# Patient Record
Sex: FEMALE | Race: WHITE | Employment: FULL TIME | URBAN - METROPOLITAN AREA
[De-identification: names, ages, dates, MRNs, and addresses within clinical notes are randomized per-mention and may not be internally consistent; named-entity substitution may affect disease eponyms.]

---

## 2021-05-19 ENCOUNTER — OFFICE VISIT (OUTPATIENT)
Dept: INTERNAL MEDICINE CLINIC | Age: 51
End: 2021-05-19
Payer: COMMERCIAL

## 2021-05-19 VITALS
HEIGHT: 68 IN | OXYGEN SATURATION: 97 % | DIASTOLIC BLOOD PRESSURE: 76 MMHG | HEART RATE: 90 BPM | TEMPERATURE: 98.2 F | RESPIRATION RATE: 16 BRPM | WEIGHT: 203 LBS | BODY MASS INDEX: 30.77 KG/M2 | SYSTOLIC BLOOD PRESSURE: 117 MMHG

## 2021-05-19 DIAGNOSIS — I10 ESSENTIAL HYPERTENSION: Primary | ICD-10-CM

## 2021-05-19 DIAGNOSIS — Z12.11 COLON CANCER SCREENING: ICD-10-CM

## 2021-05-19 DIAGNOSIS — J30.89 ENVIRONMENTAL AND SEASONAL ALLERGIES: ICD-10-CM

## 2021-05-19 DIAGNOSIS — E78.2 MIXED HYPERLIPIDEMIA: ICD-10-CM

## 2021-05-19 DIAGNOSIS — Z12.4 PAP SMEAR FOR CERVICAL CANCER SCREENING: ICD-10-CM

## 2021-05-19 DIAGNOSIS — E89.0 POSTOPERATIVE HYPOTHYROIDISM: ICD-10-CM

## 2021-05-19 DIAGNOSIS — J32.9 RECURRENT SINUS INFECTIONS: ICD-10-CM

## 2021-05-19 DIAGNOSIS — D51.0 PERNICIOUS ANEMIA: ICD-10-CM

## 2021-05-19 DIAGNOSIS — Z72.0 TOBACCO ABUSE: ICD-10-CM

## 2021-05-19 DIAGNOSIS — Z86.018 HISTORY OF BENIGN BREAST TUMOR: ICD-10-CM

## 2021-05-19 DIAGNOSIS — K21.9 GASTROESOPHAGEAL REFLUX DISEASE WITHOUT ESOPHAGITIS: ICD-10-CM

## 2021-05-19 DIAGNOSIS — E53.8 B12 DEFICIENCY: ICD-10-CM

## 2021-05-19 DIAGNOSIS — Z71.6 TOBACCO ABUSE COUNSELING: ICD-10-CM

## 2021-05-19 LAB
ALBUMIN SERPL-MCNC: 4.2 G/DL (ref 3.5–5)
ALBUMIN/GLOB SERPL: 1.2 {RATIO} (ref 1.1–2.2)
ALP SERPL-CCNC: 113 U/L (ref 45–117)
ALT SERPL-CCNC: 24 U/L (ref 12–78)
ANION GAP SERPL CALC-SCNC: 6 MMOL/L (ref 5–15)
APPEARANCE UR: CLEAR
AST SERPL-CCNC: 14 U/L (ref 15–37)
BASOPHILS # BLD: 0.1 K/UL (ref 0–0.1)
BASOPHILS NFR BLD: 1 % (ref 0–1)
BILIRUB SERPL-MCNC: 0.4 MG/DL (ref 0.2–1)
BILIRUB UR QL: NEGATIVE
BUN SERPL-MCNC: 9 MG/DL (ref 6–20)
BUN/CREAT SERPL: 13 (ref 12–20)
CALCIUM SERPL-MCNC: 9.6 MG/DL (ref 8.5–10.1)
CHLORIDE SERPL-SCNC: 100 MMOL/L (ref 97–108)
CHOLEST SERPL-MCNC: 305 MG/DL
CO2 SERPL-SCNC: 31 MMOL/L (ref 21–32)
COLOR UR: NORMAL
CREAT SERPL-MCNC: 0.72 MG/DL (ref 0.55–1.02)
DIFFERENTIAL METHOD BLD: ABNORMAL
EOSINOPHIL # BLD: 0.5 K/UL (ref 0–0.4)
EOSINOPHIL NFR BLD: 5 % (ref 0–7)
ERYTHROCYTE [DISTWIDTH] IN BLOOD BY AUTOMATED COUNT: 12.9 % (ref 11.5–14.5)
GLOBULIN SER CALC-MCNC: 3.4 G/DL (ref 2–4)
GLUCOSE SERPL-MCNC: 97 MG/DL (ref 65–100)
GLUCOSE UR STRIP.AUTO-MCNC: NEGATIVE MG/DL
HCT VFR BLD AUTO: 41 % (ref 35–47)
HDLC SERPL-MCNC: 48 MG/DL
HDLC SERPL: 6.4 {RATIO} (ref 0–5)
HGB BLD-MCNC: 13.3 G/DL (ref 11.5–16)
HGB UR QL STRIP: NEGATIVE
IMM GRANULOCYTES # BLD AUTO: 0 K/UL (ref 0–0.04)
IMM GRANULOCYTES NFR BLD AUTO: 0 % (ref 0–0.5)
KETONES UR QL STRIP.AUTO: NEGATIVE MG/DL
LDLC SERPL CALC-MCNC: 186.2 MG/DL (ref 0–100)
LEUKOCYTE ESTERASE UR QL STRIP.AUTO: NEGATIVE
LYMPHOCYTES # BLD: 2.3 K/UL (ref 0.8–3.5)
LYMPHOCYTES NFR BLD: 23 % (ref 12–49)
MCH RBC QN AUTO: 31.5 PG (ref 26–34)
MCHC RBC AUTO-ENTMCNC: 32.4 G/DL (ref 30–36.5)
MCV RBC AUTO: 97.2 FL (ref 80–99)
MICROALBUMIN UR TEST STR-MCNC: 10 MG/L (ref 0–20)
MONOCYTES # BLD: 0.8 K/UL (ref 0–1)
MONOCYTES NFR BLD: 8 % (ref 5–13)
NEUTS SEG # BLD: 6.5 K/UL (ref 1.8–8)
NEUTS SEG NFR BLD: 63 % (ref 32–75)
NITRITE UR QL STRIP.AUTO: NEGATIVE
NRBC # BLD: 0 K/UL (ref 0–0.01)
NRBC BLD-RTO: 0 PER 100 WBC
PH UR STRIP: 6.5 [PH] (ref 5–8)
PLATELET # BLD AUTO: 285 K/UL (ref 150–400)
PMV BLD AUTO: 11.8 FL (ref 8.9–12.9)
POTASSIUM SERPL-SCNC: 4.5 MMOL/L (ref 3.5–5.1)
PROT SERPL-MCNC: 7.6 G/DL (ref 6.4–8.2)
PROT UR STRIP-MCNC: NEGATIVE MG/DL
RBC # BLD AUTO: 4.22 M/UL (ref 3.8–5.2)
SODIUM SERPL-SCNC: 137 MMOL/L (ref 136–145)
SP GR UR REFRACTOMETRY: 1.01 (ref 1–1.03)
TRIGL SERPL-MCNC: 354 MG/DL (ref ?–150)
UROBILINOGEN UR QL STRIP.AUTO: 0.2 EU/DL (ref 0.2–1)
VIT B12 SERPL-MCNC: 883 PG/ML (ref 193–986)
VLDLC SERPL CALC-MCNC: 70.8 MG/DL
WBC # BLD AUTO: 10.3 K/UL (ref 3.6–11)

## 2021-05-19 PROCEDURE — 99214 OFFICE O/P EST MOD 30 MIN: CPT | Performed by: NURSE PRACTITIONER

## 2021-05-19 PROCEDURE — 93000 ELECTROCARDIOGRAM COMPLETE: CPT | Performed by: NURSE PRACTITIONER

## 2021-05-19 PROCEDURE — 82044 UR ALBUMIN SEMIQUANTITATIVE: CPT | Performed by: NURSE PRACTITIONER

## 2021-05-19 RX ORDER — PANTOPRAZOLE SODIUM 40 MG/1
40 TABLET, DELAYED RELEASE ORAL DAILY
Qty: 30 TABLET | Refills: 1 | Status: SHIPPED | OUTPATIENT
Start: 2021-05-19 | End: 2021-05-19 | Stop reason: SDUPTHER

## 2021-05-19 RX ORDER — LISINOPRIL AND HYDROCHLOROTHIAZIDE 12.5; 2 MG/1; MG/1
TABLET ORAL DAILY
COMMUNITY
End: 2021-05-19 | Stop reason: SDUPTHER

## 2021-05-19 RX ORDER — LEVOTHYROXINE SODIUM 175 UG/1
175 TABLET ORAL
Qty: 90 TABLET | Refills: 3 | Status: SHIPPED | OUTPATIENT
Start: 2021-05-19 | End: 2021-09-17 | Stop reason: DRUGHIGH

## 2021-05-19 RX ORDER — SYRINGE W-NEEDLE,DISPOSAB,3 ML 25GX5/8"
1 SYRINGE, EMPTY DISPOSABLE MISCELLANEOUS
Qty: 12 SYRINGE | Refills: 3 | Status: SHIPPED | OUTPATIENT
Start: 2021-05-19 | End: 2021-06-16 | Stop reason: SDUPTHER

## 2021-05-19 RX ORDER — LEVOTHYROXINE SODIUM 175 UG/1
175 TABLET ORAL
COMMUNITY
End: 2021-05-19 | Stop reason: SDUPTHER

## 2021-05-19 RX ORDER — PANTOPRAZOLE SODIUM 40 MG/1
40 TABLET, DELAYED RELEASE ORAL DAILY
Qty: 90 TABLET | Refills: 3 | Status: SHIPPED | OUTPATIENT
Start: 2021-05-19 | End: 2022-04-01 | Stop reason: SDUPTHER

## 2021-05-19 RX ORDER — LORATADINE 10 MG/1
10 TABLET ORAL
COMMUNITY

## 2021-05-19 RX ORDER — LISINOPRIL AND HYDROCHLOROTHIAZIDE 12.5; 2 MG/1; MG/1
1 TABLET ORAL DAILY
Qty: 90 TABLET | Refills: 3 | Status: SHIPPED | OUTPATIENT
Start: 2021-05-19 | End: 2022-04-01 | Stop reason: SDUPTHER

## 2021-05-19 RX ORDER — MOMETASONE FUROATE 50 UG/1
2 SPRAY, METERED NASAL DAILY
COMMUNITY

## 2021-05-19 RX ORDER — DIPHENHYDRAMINE HCL 25 MG
25 TABLET ORAL
COMMUNITY

## 2021-05-19 RX ORDER — CYANOCOBALAMIN 1000 UG/ML
1000 INJECTION, SOLUTION INTRAMUSCULAR; SUBCUTANEOUS
Qty: 12 VIAL | Refills: 3
Start: 2021-05-19 | End: 2021-06-16 | Stop reason: SDUPTHER

## 2021-05-19 RX ORDER — PANTOPRAZOLE SODIUM 40 MG/1
40 TABLET, DELAYED RELEASE ORAL DAILY
Qty: 30 TABLET | Refills: 1 | Status: SHIPPED
Start: 2021-05-19 | End: 2021-06-16 | Stop reason: SDUPTHER

## 2021-05-19 RX ORDER — CYANOCOBALAMIN 1000 UG/ML
1000 INJECTION, SOLUTION INTRAMUSCULAR; SUBCUTANEOUS ONCE
COMMUNITY
End: 2021-05-19 | Stop reason: SDUPTHER

## 2021-05-19 NOTE — PROGRESS NOTES
Chief Complaint   Patient presents with    [facility]     new patient    Allergies       SUBJECTIVE:    Macario Shearer is a 45 y.o. female who is new to me, and here today to discuss current medical concerns and conditions. Patient has a history of hypertension, post thyroidectomy hypothyroidism, pernicious anemia on B12 deficiency, allergic rhinitis, tobacco abuse, and elevated cholesterol levels by lab. She is currently being managed for hypertension, and is compliant with current medications as listed. She denies any adverse side effects of medication(s) at this time, and states She is tolerating them well. The patient denies any symptoms of chest pain, shortness of breath, dizziness, orthostasis, or palpitations. She is not measuring blood pressure on a regular basis. The patient states a history of elevated cholesterol in the past, and excessively high LDL. She states she was trialed on a statin medication several years ago, but had adverse reaction with muscle and joint pain, and was subsequently stopped from taking the medication any longer. She states it has been quite sometime since she had her cholesterol levels checked again. Patient also suffers from seasonal and environmental allergies. She states she is currently taking over-the-counter Claritin, Nasonex, and Benadryl which appear to be helping somewhat. However, she continues to have breakthrough symptoms, and has a history of recurrent sinus infections as a result. She also states intermittent issues with reflux and heartburn. She denies any black tarry stools or blood in her stools. She states she occasionally uses an over-the-counter acid blocker, but not regularly. She is requesting a referral to a gynecologist to do her Pap smears. She has a history of benign tumor of the breast with lumpectomy. She states she has never had a colonoscopy done to screen for colon cancer.       Current Outpatient Medications Medication Sig Dispense Refill    mometasone (Nasonex) 50 mcg/actuation nasal spray 2 Sprays daily.  loratadine (Claritin) 10 mg tablet Take 10 mg by mouth.  diphenhydrAMINE (Benadryl Allergy) 25 mg tablet Take 25 mg by mouth every six (6) hours as needed.  emollient base (CREAM BASE EX) by Apply Externally route.  Syringe with Needle, Disp, 3 mL 22 x 1 1/2\" syrg 1 Units by Does Not Apply route every seven (7) days. Indications: Use with B12 weekly 12 Syringe 3    cyanocobalamin (VITAMIN B12) 1,000 mcg/mL injection 1 mL by IntraMUSCular route every seven (7) days. Indications: anemia due to vitamin B12 deficiency-pernicious anemia, inadequate vitamin B12 12 Vial 3    levothyroxine (Synthroid) 175 mcg tablet Take 1 Tablet by mouth Daily (before breakfast). Indications: a condition with low thyroid hormone levels 90 Tablet 3    lisinopril-hydroCHLOROthiazide (PRINZIDE, ZESTORETIC) 20-12.5 mg per tablet Take 1 Tablet by mouth daily. Indications: high blood pressure 90 Tablet 3    pantoprazole (PROTONIX) 40 mg tablet Take 1 Tablet by mouth daily. Indications: indigestion 90 Tablet 3    pantoprazole (PROTONIX) 40 mg tablet Take 1 Tablet by mouth daily. 30 Tablet 1     History reviewed. No pertinent past medical history. History reviewed. No pertinent surgical history.   Allergies   Allergen Reactions    Latex Other (comments)    Augmentin [Amoxicillin-Pot Clavulanate] Other (comments)    Bee Venom Protein (Honey Bee) Other (comments)    Erythromycin Other (comments)    Sulfur-8 Other (comments)       REVIEW OF SYSTEMS:                                        POSITIVE= bold text  Negative = regular text    General:                     fever, chills, sweats, generalized weakness, weight loss/gain,                                       loss of appetite   Eyes:                           blurred vision, eye pain, loss of vision, double vision  ENT:                            rhinorrhea, pharyngitis   Respiratory:               cough, sputum production, SOB, NAVAS, wheezing, pleuritic pain   Cardiology:                chest pain, palpitations, orthopnea, PND, edema, syncope   Gastrointestinal:       abdominal pain , N/V, diarrhea, dysphagia, constipation, bleeding, reflux  Genitourinary:           frequency, urgency, dysuria, hematuria, incontinence   Muskuloskeletal :      arthralgia, myalgia, back pain  Hematology:              easy bruising, nose or gum bleeding, lymphadenopathy   Dermatological:         rash, ulceration, pruritis, color change / jaundice  Endocrine:                 hot flashes or polydipsia   Neurological:             headache, dizziness, confusion, focal weakness, paresthesia,                                      Speech difficulties, memory loss, gait difficulty  Psychological:          Feelings of anxiety, depression, agitation        Social History     Socioeconomic History    Marital status: UNKNOWN     Spouse name: Not on file    Number of children: Not on file    Years of education: Not on file    Highest education level: Not on file   Tobacco Use    Smoking status: Current Every Day Smoker     Types: Cigarettes    Smokeless tobacco: Never Used   Substance and Sexual Activity    Alcohol use: Yes     Social Determinants of Health     Financial Resource Strain:     Difficulty of Paying Living Expenses:    Food Insecurity:     Worried About Running Out of Food in the Last Year:     Ran Out of Food in the Last Year:    Transportation Needs:     Lack of Transportation (Medical):      Lack of Transportation (Non-Medical):    Physical Activity:     Days of Exercise per Week:     Minutes of Exercise per Session:    Stress:     Feeling of Stress :    Social Connections:     Frequency of Communication with Friends and Family:     Frequency of Social Gatherings with Friends and Family:     Attends [employer] Services:     Active Member of Clubs or Organizations:     Attends Club or Organization Meetings:     Marital Status:      History reviewed. No pertinent family history. OBJECTIVE:     Visit Vitals  /76 (BP 1 Location: Left upper arm, BP Patient Position: Sitting, BP Cuff Size: Small adult)   Pulse 90   Temp 98.2 °F (36.8 °C)   Resp 16   Ht 5' 8\" (1.727 m)   Wt 203 lb (92.1 kg)   SpO2 97%   BMI 30.87 kg/m²       Constitutional: She appears well nourished, of stated age, and dressed appropriately. Eyes: Sclera anicteric, PERRLA, EOMI  ENT: Nares clear, inflamed turbinates bilaterally with clear exudate. Cobblestone appearance to oropharynx. Neck: Supple without lymphadenopathy. Thyroid normal, No JVD or bruits  Respiratory: Clear to ascultation X5, normal inspiratory effort, no adventitious breath sounds. Cardiovascular: Regular rate and rhythm, no murmurs, rubs or gallops, PMI not displaced, No thrills, no peripheral edema  Gastrointestinal: Abdomen non-distended, soft, mild epigastric tenderness on palpation, bowel sounds normal and active X4. Hematologic: No purpura, petechiae or unexplained bruising  Lymphatic: No lymph node enlargemant. Integumentary: No unusual rashes or suspicious skin lesions noted. Nails appear normal.  Neuro: Non-focal exam, A & O X 3.   Psychiatric: Appropriate affect and demeanor, pleasant and cooperative. Patient's thought content and thought processing appear to be within normal limits. EKG: EKG performed in office today and read by me. Patient shows no significant ST elevation/depression. No signs of T wave ischemia. Otherwise unremarkable EKG. ASSESSMENT/PLAN:     ICD-10-CM ICD-9-CM    1.  Essential hypertension  I10 401.9 AMB POC EKG ROUTINE W/ 12 LEADS, INTER & REP      lisinopril-hydroCHLOROthiazide (PRINZIDE, ZESTORETIC) 20-12.5 mg per tablet      CBC WITH AUTOMATED DIFF      METABOLIC PANEL, COMPREHENSIVE      URINALYSIS W/ RFLX MICROSCOPIC      AMB POC URINE, MICROALBUMIN, SEMIQUANT (1 RESULT)      URINALYSIS W/ RFLX MICROSCOPIC      METABOLIC PANEL, COMPREHENSIVE      CBC WITH AUTOMATED DIFF   2. Mixed hyperlipidemia  K33.2 544.1 METABOLIC PANEL, COMPREHENSIVE      LIPID PANEL      LIPID PANEL      METABOLIC PANEL, COMPREHENSIVE   3. Tobacco abuse  Z72.0 305.1    4. Tobacco abuse counseling  Z71.6 V65.42      305.1    5. Environmental and seasonal allergies  J30.89 477.8    6. Recurrent sinus infections  J32.9 473.9    7. Postoperative hypothyroidism  E89.0 244.0 levothyroxine (Synthroid) 175 mcg tablet   8. Pernicious anemia  D51.0 281.0 Syringe with Needle, Disp, 3 mL 22 x 1 1/2\" syrg      cyanocobalamin (VITAMIN B12) 1,000 mcg/mL injection      VITAMIN B12      VITAMIN B12   9. B12 deficiency  E53.8 266.2 Syringe with Needle, Disp, 3 mL 22 x 1 1/2\" syrg      cyanocobalamin (VITAMIN B12) 1,000 mcg/mL injection      VITAMIN B12      VITAMIN B12   10. Gastroesophageal reflux disease without esophagitis  K21.9 530.81 pantoprazole (PROTONIX) 40 mg tablet      pantoprazole (PROTONIX) 40 mg tablet      DISCONTINUED: pantoprazole (PROTONIX) 40 mg tablet   11. History of benign breast tumor  Z86.018 V13.89    12. Colon cancer screening  Z12.11 V76.51 REFERRAL FOR COLONOSCOPY   13. Pap smear for cervical cancer screening  Z12.4 V76.2 REFERRAL TO OBSTETRICS AND GYNECOLOGY     1: We will do baseline labs today including: CBC, CMP, lipid panel, TSH, urinalysis, microalbumin, and B12 level. 2: I will start the patient on Protonix 40 mg daily for management of her symptoms of GERD  3: Patient to resume B12 injections 1000 mcg weekly as requested. 4: Patient to start Allegra 180 mg daily for management of allergies. May continue to take Nasonex as well.  5: Patient will be referred to OB/GYN as requested for further Pap smears. 6: Patient will be referred to GI for screening colonoscopy. 7: Patient to follow-up with me in approximately 1 month, or sooner as needed. Patient states understanding and agrees with plan.     Orders Placed This Encounter    CBC WITH AUTOMATED DIFF    METABOLIC PANEL, COMPREHENSIVE    LIPID PANEL    URINALYSIS W/ RFLX MICROSCOPIC    VITAMIN B12    Referral for Colonoscopy (options for GI, Colon &  Rectal Surgery, & General Surgery)    REFERRAL TO OBSTETRICS AND GYNECOLOGY    AMB POC URINE, MICROALBUMIN, SEMIQUANT (1 RESULT)    AMB POC EKG ROUTINE W/ 12 LEADS, INTER & REP    mometasone (Nasonex) 50 mcg/actuation nasal spray    loratadine (Claritin) 10 mg tablet    diphenhydrAMINE (Benadryl Allergy) 25 mg tablet    DISCONTD: levothyroxine (Synthroid) 175 mcg tablet    DISCONTD: lisinopril-hydroCHLOROthiazide (PRINZIDE, ZESTORETIC) 20-12.5 mg per tablet    DISCONTD: cyanocobalamin (VITAMIN B12) 1,000 mcg/mL injection    emollient base (CREAM BASE EX)    DISCONTD: pantoprazole (PROTONIX) 40 mg tablet    Syringe with Needle, Disp, 3 mL 22 x 1 1/2\" syrg    cyanocobalamin (VITAMIN B12) 1,000 mcg/mL injection    levothyroxine (Synthroid) 175 mcg tablet    lisinopril-hydroCHLOROthiazide (PRINZIDE, ZESTORETIC) 20-12.5 mg per tablet    pantoprazole (PROTONIX) 40 mg tablet    pantoprazole (PROTONIX) 40 mg tablet         ATTENTION:   This medical record was transcribed using an electronic medical records system. Although proofread, it may and can contain electronic and spelling errors. Other human spelling and other errors may be present. Corrections may be executed at a later time. Please feel free to contact us for any clarifications as needed. Follow-up and Dispositions    · Return in about 4 weeks (around 6/16/2021) for Follow up. Signed,  Wood Haynes.  Fidela Habermann, MSN APRN FNP-BC

## 2021-05-24 NOTE — PROGRESS NOTES
Domo Dubon is a 45 y.o. female  HIPAA verified by two patient identifiers. Called spoke to pt. Advised patient of the following B12 is currently in normal range.  You may start weekly injections as discussed.     Urinalysis is normal.      Cholesterol levels are excessively elevated as predicted.  This is likely genetic in origin, but will need to be treated to reduce long-term risk factors.  Please let me know what previous medications you have tried and any that did not agree with you.     Kidney function, liver functions, and electrolytes are normal.  Blood count shows no signs of anemia presently. Pt verbalized understanding of information discussed w/ no further questions at this time.

## 2021-05-24 NOTE — PROGRESS NOTES
B12 is currently in normal range. You may start weekly injections as discussed. Urinalysis is normal.     Cholesterol levels are excessively elevated as predicted. This is likely genetic in origin, but will need to be treated to reduce long-term risk factors. Please let me know what previous medications you have tried and any that did not agree with you. Kidney function, liver functions, and electrolytes are normal.  Blood count shows no signs of anemia presently.

## 2021-06-16 ENCOUNTER — OFFICE VISIT (OUTPATIENT)
Dept: INTERNAL MEDICINE CLINIC | Age: 51
End: 2021-06-16
Payer: COMMERCIAL

## 2021-06-16 VITALS
DIASTOLIC BLOOD PRESSURE: 88 MMHG | HEART RATE: 77 BPM | OXYGEN SATURATION: 99 % | TEMPERATURE: 98.6 F | SYSTOLIC BLOOD PRESSURE: 132 MMHG | RESPIRATION RATE: 16 BRPM | HEIGHT: 70 IN | WEIGHT: 207.6 LBS | BODY MASS INDEX: 29.72 KG/M2

## 2021-06-16 DIAGNOSIS — D51.0 PERNICIOUS ANEMIA: ICD-10-CM

## 2021-06-16 DIAGNOSIS — E78.01 FAMILIAL HYPERCHOLESTEROLEMIA: ICD-10-CM

## 2021-06-16 DIAGNOSIS — Z71.6 TOBACCO ABUSE COUNSELING: ICD-10-CM

## 2021-06-16 DIAGNOSIS — I10 ESSENTIAL HYPERTENSION: Primary | ICD-10-CM

## 2021-06-16 DIAGNOSIS — Z72.0 TOBACCO ABUSE: ICD-10-CM

## 2021-06-16 DIAGNOSIS — E53.8 B12 DEFICIENCY: ICD-10-CM

## 2021-06-16 DIAGNOSIS — K21.9 GASTROESOPHAGEAL REFLUX DISEASE WITHOUT ESOPHAGITIS: ICD-10-CM

## 2021-06-16 DIAGNOSIS — E03.9 ACQUIRED HYPOTHYROIDISM: ICD-10-CM

## 2021-06-16 DIAGNOSIS — I25.83 CORONARY ARTERY DISEASE DUE TO LIPID RICH PLAQUE: ICD-10-CM

## 2021-06-16 DIAGNOSIS — I25.10 CORONARY ARTERY DISEASE DUE TO LIPID RICH PLAQUE: ICD-10-CM

## 2021-06-16 DIAGNOSIS — Z78.9 STATIN INTOLERANCE: ICD-10-CM

## 2021-06-16 DIAGNOSIS — E78.2 MIXED HYPERLIPIDEMIA: ICD-10-CM

## 2021-06-16 PROCEDURE — 99214 OFFICE O/P EST MOD 30 MIN: CPT | Performed by: NURSE PRACTITIONER

## 2021-06-16 RX ORDER — SYRINGE W-NEEDLE,DISPOSAB,3 ML 25GX5/8"
1 SYRINGE, EMPTY DISPOSABLE MISCELLANEOUS
Qty: 12 SYRINGE | Refills: 3 | Status: SHIPPED | OUTPATIENT
Start: 2021-06-16 | End: 2022-01-11 | Stop reason: SDUPTHER

## 2021-06-16 RX ORDER — CYANOCOBALAMIN 1000 UG/ML
1000 INJECTION, SOLUTION INTRAMUSCULAR; SUBCUTANEOUS
Qty: 12 VIAL | Refills: 3
Start: 2021-06-16 | End: 2021-06-21 | Stop reason: SDUPTHER

## 2021-06-16 RX ORDER — EZETIMIBE 10 MG/1
10 TABLET ORAL DAILY
Qty: 90 TABLET | Refills: 1 | Status: SHIPPED | OUTPATIENT
Start: 2021-06-16 | End: 2022-04-01 | Stop reason: SDUPTHER

## 2021-06-16 RX ORDER — FEXOFENADINE HCL AND PSEUDOEPHEDRINE HCI 60; 120 MG/1; MG/1
1 TABLET, EXTENDED RELEASE ORAL EVERY 12 HOURS
COMMUNITY

## 2021-06-16 NOTE — PROGRESS NOTES
Sienna Henderson is a 45 y.o. female    Chief Complaint   Patient presents with    Discuss Medications     4 wk follow up       Visit Vitals  /88 (BP 1 Location: Left upper arm, BP Patient Position: Sitting)   Pulse 77   Temp 98.6 °F (37 °C)   Resp 16   Ht 5' 10\" (1.778 m)   Wt 207 lb 9.6 oz (94.2 kg)   SpO2 99%   BMI 29.79 kg/m²           1. Have you been to the ER, urgent care clinic since your last visit? Hospitalized since your last visit? No     2. Have you seen or consulted any other health care providers outside of the [address] [place] [address] since your last visit? Include any pap smears or colon screening.  No

## 2021-06-16 NOTE — PROGRESS NOTES
Chief Complaint   Patient presents with    Discuss Medications     4 wk follow up       SUBJECTIVE:    Enrique Cordova is a 45 y.o. female who is here today for a follow up appointment regarding her hypertension, hypercholesterolemia, hypothyroidism, and gastroesophageal reflux disease. At her last encounter, the patient had fasting lab work performed, and was found to have a total cholesterol level of greater than 300, and an LDL level of 185. She has been trialed on statins in the past, but was intolerant due to excessive pain to her muscles and joints. She has not tried anything else since that time. She continues to smoke cigarettes despite repeated warnings, but states that she had successfully quit for a period of time during her pregnancies in the past.  She has tried Chantix and Wellbutrin, but without much efficacy. She continues to take medication for management of her hypertension, and is tolerating well overall. She is not checking her blood pressures on a regular basis though. She is taking Protonix for management of her gastroesophageal reflux issues, and has had excellent efficacy with use. She denies any further symptoms at this time. She continues to take Synthroid for management of her hypothyroidism, and is tolerating well. She denies any recent episodes of chest pain, chest pressure, shortness of breath, headaches, dizziness, palpitations, or syncope episodes. Current Outpatient Medications   Medication Sig Dispense Refill    fexofenadine-pseudoephedrine (Allegra-D 12 Hour)  mg Tb12 Take 1 Tablet by mouth every twelve (12) hours.  ezetimibe (ZETIA) 10 mg tablet Take 1 Tablet by mouth daily. 90 Tablet 1    mometasone (Nasonex) 50 mcg/actuation nasal spray 2 Sprays daily.  diphenhydrAMINE (Benadryl Allergy) 25 mg tablet Take 25 mg by mouth every six (6) hours as needed.  emollient base (CREAM BASE EX) by Apply Externally route.       Syringe with Needle, Disp, 3 mL 22 x 1 1/2\" syrg 1 Units by Does Not Apply route every seven (7) days. Indications: Use with B12 weekly 12 Syringe 3    cyanocobalamin (VITAMIN B12) 1,000 mcg/mL injection 1 mL by IntraMUSCular route every seven (7) days. Indications: anemia due to vitamin B12 deficiency-pernicious anemia, inadequate vitamin B12 12 Vial 3    levothyroxine (Synthroid) 175 mcg tablet Take 1 Tablet by mouth Daily (before breakfast). Indications: a condition with low thyroid hormone levels 90 Tablet 3    lisinopril-hydroCHLOROthiazide (PRINZIDE, ZESTORETIC) 20-12.5 mg per tablet Take 1 Tablet by mouth daily. Indications: high blood pressure 90 Tablet 3    pantoprazole (PROTONIX) 40 mg tablet Take 1 Tablet by mouth daily. Indications: indigestion 90 Tablet 3    loratadine (Claritin) 10 mg tablet Take 10 mg by mouth. (Patient not taking: Reported on 6/16/2021)       History reviewed. No pertinent past medical history. History reviewed. No pertinent surgical history.   Allergies   Allergen Reactions    Latex Other (comments)    Augmentin [Amoxicillin-Pot Clavulanate] Other (comments)    Bee Venom Protein (Honey Bee) Other (comments)    Erythromycin Other (comments)    Sulfur-8 Other (comments)       REVIEW OF SYSTEMS:                                        POSITIVE= bold text  Negative = regular text    General:                     fever, chills, sweats, generalized weakness, weight loss/gain,                                       loss of appetite   Eyes:                           blurred vision, eye pain, loss of vision, double vision  ENT:                            rhinorrhea, pharyngitis   Respiratory:               cough, sputum production, SOB, NAVAS, wheezing, pleuritic pain   Cardiology:                chest pain, palpitations, orthopnea, PND, edema, syncope   Gastrointestinal:       abdominal pain , N/V, diarrhea, dysphagia, constipation, bleeding   Genitourinary:           frequency, urgency, dysuria, hematuria, incontinence   Muskuloskeletal :      arthralgia, myalgia, back pain  Hematology:              easy bruising, nose or gum bleeding, lymphadenopathy   Dermatological:         rash, ulceration, pruritis, color change / jaundice  Endocrine:                 hot flashes or polydipsia   Neurological:             headache, dizziness, confusion, focal weakness, paresthesia,                                      Speech difficulties, memory loss, gait difficulty  Psychological:          Feelings of anxiety, depression, agitation        Social History     Socioeconomic History    Marital status: UNKNOWN     Spouse name: Not on file    Number of children: Not on file    Years of education: Not on file    Highest education level: Not on file   Tobacco Use    Smoking status: Current Every Day Smoker     Types: Cigarettes    Smokeless tobacco: Never Used   Substance and Sexual Activity    Alcohol use: Yes     Social Determinants of Health     Financial Resource Strain:     Difficulty of Paying Living Expenses:    Food Insecurity:     Worried About Running Out of Food in the Last Year:     Ran Out of Food in the Last Year:    Transportation Needs:     Lack of Transportation (Medical):  Lack of Transportation (Non-Medical):    Physical Activity:     Days of Exercise per Week:     Minutes of Exercise per Session:    Stress:     Feeling of Stress :    Social Connections:     Frequency of Communication with Friends and Family:     Frequency of Social Gatherings with Friends and Family:     Attends [employer] Services:     Active Member of Clubs or Organizations:     Attends Club or Organization Meetings:     Marital Status:      History reviewed. No pertinent family history.     OBJECTIVE:     Visit Vitals  /88 (BP 1 Location: Left upper arm, BP Patient Position: Sitting)   Pulse 77   Temp 98.6 °F (37 °C)   Resp 16   Ht 5' 8\" (1.727 m)   Wt 207 lb 9.6 oz (94.2 kg)   SpO2 99%   BMI 31.57 kg/m² Constitutional: She appears well nourished, of stated age, and dressed appropriately. Eyes: Sclera anicteric, PERRLA, EOMI  Neck: Supple without lymphadenopathy. Thyroid normal, No JVD or bruits  Respiratory: Clear to ascultation X5, normal inspiratory effort, no adventitious breath sounds. Cardiovascular: Regular rate and rhythm, no murmurs, rubs or gallops, PMI not displaced, No thrills, no peripheral edema  Neuro: Non-focal exam, A & O X 3.   Psychiatric: Appropriate affect and demeanor, pleasant and cooperative. Patient's thought content and thought processing appear to be within normal limits. ASSESSMENT/PLAN:     ICD-10-CM ICD-9-CM    1. Essential hypertension  I10 401.9 CT HEART W/O CONT WITH CALCIUM   2. Mixed hyperlipidemia  E78.2 272.2 ezetimibe (ZETIA) 10 mg tablet      CT HEART W/O CONT WITH CALCIUM   3. Familial hypercholesterolemia  E78.01 272.0 CT HEART W/O CONT WITH CALCIUM   4. Acquired hypothyroidism  E03.9 244.9    5. Gastroesophageal reflux disease without esophagitis  K21.9 530.81    6. Tobacco abuse  Z72.0 305.1 CT HEART W/O CONT WITH CALCIUM   7. Tobacco abuse counseling  Z71.6 V65.42      305.1    8. Statin intolerance  Z78.9 995.27      1: Patient to continue lisinopril/HCTZ for management of hypertension. 2: Patient to continue Synthroid for management of hypothyroidism. 3: I will start patient on Zetia 10 mg daily for management of cholesterol. 4: Patient will be sent for CT scan of heart with calcium. 5: Patient to continue Protonix daily for management of GERD. 6: Patient must stop smoking! Benefits and options were thoroughly discussed today. Patient states she will work on this. 7: ER warnings given to patient for any chest pain, chest pressure, palpitations, shortness of breath, dizziness, headaches or syncope episodes. 8: Patient to follow-up with me in approximately 3 months, or sooner as needed. Patient states understanding and agrees with plan.     Orders Placed This Encounter    CT HEART W/O CONT WITH CALCIUM    fexofenadine-pseudoephedrine (Allegra-D 12 Hour)  mg Tb12    ezetimibe (ZETIA) 10 mg tablet         ATTENTION:   This medical record was transcribed using an electronic medical records system. Although proofread, it may and can contain electronic and spelling errors. Other human spelling and other errors may be present. Corrections may be executed at a later time. Please feel free to contact us for any clarifications as needed. Signed,  Jhonny Barnes.  Iliana Abrams, MSN APRN FNP-BC

## 2021-06-21 ENCOUNTER — HOSPITAL ENCOUNTER (OUTPATIENT)
Dept: CT IMAGING | Age: 51
Discharge: HOME | End: 2021-06-21
Attending: NURSE PRACTITIONER
Payer: SELF-PAY

## 2021-06-21 DIAGNOSIS — E53.8 B12 DEFICIENCY: ICD-10-CM

## 2021-06-21 DIAGNOSIS — D51.0 PERNICIOUS ANEMIA: ICD-10-CM

## 2021-06-21 DIAGNOSIS — Z72.0 TOBACCO ABUSE: ICD-10-CM

## 2021-06-21 DIAGNOSIS — E78.01 FAMILIAL HYPERCHOLESTEROLEMIA: ICD-10-CM

## 2021-06-21 DIAGNOSIS — E78.2 MIXED HYPERLIPIDEMIA: ICD-10-CM

## 2021-06-21 DIAGNOSIS — I10 ESSENTIAL HYPERTENSION: ICD-10-CM

## 2021-06-21 PROCEDURE — 75571 CT HRT W/O DYE W/CA TEST: CPT

## 2021-06-21 RX ORDER — CYANOCOBALAMIN 1000 UG/ML
1000 INJECTION, SOLUTION INTRAMUSCULAR; SUBCUTANEOUS
Qty: 12 VIAL | Refills: 3
Start: 2021-06-21 | End: 2021-06-24 | Stop reason: SDUPTHER

## 2021-06-21 NOTE — TELEPHONE ENCOUNTER
Last Refill: 21  Last Visit: 2021   Next Visit: 2021    Requested Prescriptions     Pending Prescriptions Disp Refills    cyanocobalamin (VITAMIN B12) 1,000 mcg/mL injection 12 Vial 3     Si mL by IntraMUSCular route every seven (7) days.  Indications: anemia due to vitamin B12 deficiency-pernicious anemia, inadequate vitamin B12

## 2021-06-21 NOTE — PROGRESS NOTES
Your scan shows that you have a fairly significant amount of buildup in your coronary arteries, especially to the left anterior descending, and the left circumflex. Scores place you in the 90th percentile rank. This means that less than 10% of women in your age group will have a higher calcium score. As prescribed, I would like for you to continue the Zetia for now. In addition, I am going to refer you to cardiology for further evaluation.

## 2021-06-24 DIAGNOSIS — E53.8 B12 DEFICIENCY: ICD-10-CM

## 2021-06-24 DIAGNOSIS — D51.0 PERNICIOUS ANEMIA: ICD-10-CM

## 2021-06-24 RX ORDER — CYANOCOBALAMIN 1000 UG/ML
1000 INJECTION, SOLUTION INTRAMUSCULAR; SUBCUTANEOUS
Qty: 12 VIAL | Refills: 3
Start: 2021-06-24 | End: 2021-07-07 | Stop reason: SDUPTHER

## 2021-06-24 NOTE — TELEPHONE ENCOUNTER
PCP: Tamar Roe NP    Last appt: 2021  Future Appointments   Date Time Provider Marshall Gil   2021  9:00 AM Tamar Roe NP [facility] AMB       Requested Prescriptions     Pending Prescriptions Disp Refills    cyanocobalamin (VITAMIN B12) 1,000 mcg/mL injection 12 Vial 3     Si mL by IntraMUSCular route every seven (7) days.  Indications: anemia due to vitamin B12 deficiency-pernicious anemia, inadequate vitamin B12       Prior labs and Blood pressures:  BP Readings from Last 3 Encounters:   21 132/88   21 117/76     Lab Results   Component Value Date/Time    Sodium 137 2021 01:21 PM    Potassium 4.5 2021 01:21 PM    Chloride 100 2021 01:21 PM    CO2 31 2021 01:21 PM    Anion gap 6 2021 01:21 PM    Glucose 97 2021 01:21 PM    BUN 9 2021 01:21 PM    Creatinine 0.72 2021 01:21 PM    BUN/Creatinine ratio 13 2021 01:21 PM    GFR est AA >60 2021 01:21 PM    GFR est non-AA >60 2021 01:21 PM    Calcium 9.6 2021 01:21 PM     No results found for: HBA1C, [id number], QMG2WSCZ  Lab Results   Component Value Date/Time    Cholesterol, total 305 (H) 2021 01:21 PM    HDL Cholesterol 48 2021 01:21 PM    LDL, calculated 186.2 (H) 2021 01:21 PM    VLDL, calculated 70.8 2021 01:21 PM    Triglyceride 354 (H) 2021 01:21 PM    CHOL/HDL Ratio 6.4 (H) 2021 01:21 PM     No results found for: VITD3, XQVID2, XQVID3, XQVID, VD3RIA    No results found for: TSH, TSH2, TSH3, TSHP, TSHEXT

## 2021-06-24 NOTE — PROGRESS NOTES
Called patient and patient would like a B12 injection refill over to her [facility] mail order pharmacy. I will send over the refill today. No further questions.

## 2021-07-07 DIAGNOSIS — E53.8 B12 DEFICIENCY: ICD-10-CM

## 2021-07-07 DIAGNOSIS — D51.0 PERNICIOUS ANEMIA: ICD-10-CM

## 2021-07-07 NOTE — TELEPHONE ENCOUNTER
Last Refill: [facility] states they did not receive prescription refill request on 21  Last Visit: 2021   Next Visit: 2021    Requested Prescriptions     Pending Prescriptions Disp Refills    cyanocobalamin (VITAMIN B12) 1,000 mcg/mL injection 12 Vial 3     Si mL by IntraMUSCular route every seven (7) days.  Indications: anemia due to vitamin B12 deficiency-pernicious anemia, inadequate vitamin B12

## 2021-07-08 RX ORDER — CYANOCOBALAMIN 1000 UG/ML
1000 INJECTION, SOLUTION INTRAMUSCULAR; SUBCUTANEOUS
Qty: 12 VIAL | Refills: 3
Start: 2021-07-08 | End: 2021-07-19 | Stop reason: SDUPTHER

## 2021-07-19 DIAGNOSIS — E53.8 B12 DEFICIENCY: ICD-10-CM

## 2021-07-19 DIAGNOSIS — D51.0 PERNICIOUS ANEMIA: ICD-10-CM

## 2021-07-20 RX ORDER — CYANOCOBALAMIN 1000 UG/ML
1000 INJECTION, SOLUTION INTRAMUSCULAR; SUBCUTANEOUS
Qty: 12 VIAL | Refills: 3 | Status: SHIPPED | OUTPATIENT
Start: 2021-07-20 | End: 2022-04-27

## 2021-09-16 ENCOUNTER — OFFICE VISIT (OUTPATIENT)
Dept: INTERNAL MEDICINE CLINIC | Age: 51
End: 2021-09-16
Payer: COMMERCIAL

## 2021-09-16 VITALS
DIASTOLIC BLOOD PRESSURE: 85 MMHG | HEIGHT: 70 IN | HEART RATE: 75 BPM | TEMPERATURE: 98.6 F | WEIGHT: 206.8 LBS | BODY MASS INDEX: 29.6 KG/M2 | RESPIRATION RATE: 16 BRPM | OXYGEN SATURATION: 99 % | SYSTOLIC BLOOD PRESSURE: 119 MMHG

## 2021-09-16 DIAGNOSIS — Z71.6 TOBACCO ABUSE COUNSELING: ICD-10-CM

## 2021-09-16 DIAGNOSIS — Z72.0 TOBACCO ABUSE: ICD-10-CM

## 2021-09-16 DIAGNOSIS — Z71.85 VACCINE COUNSELING: ICD-10-CM

## 2021-09-16 DIAGNOSIS — E78.2 MIXED HYPERLIPIDEMIA: ICD-10-CM

## 2021-09-16 DIAGNOSIS — Z79.899 ON STATIN THERAPY: ICD-10-CM

## 2021-09-16 DIAGNOSIS — M79.10 MYALGIA: ICD-10-CM

## 2021-09-16 DIAGNOSIS — I10 ESSENTIAL HYPERTENSION: Primary | ICD-10-CM

## 2021-09-16 DIAGNOSIS — E03.9 ACQUIRED HYPOTHYROIDISM: ICD-10-CM

## 2021-09-16 PROBLEM — Z91.89 AT HIGH RISK FOR BREAST CANCER: Status: ACTIVE | Noted: 2021-07-01

## 2021-09-16 LAB
ALBUMIN SERPL-MCNC: 4.2 G/DL (ref 3.5–5)
ALBUMIN/GLOB SERPL: 1.4 {RATIO} (ref 1.1–2.2)
ALP SERPL-CCNC: 121 U/L (ref 45–117)
ALT SERPL-CCNC: 24 U/L (ref 12–78)
ANION GAP SERPL CALC-SCNC: 6 MMOL/L (ref 5–15)
AST SERPL-CCNC: 14 U/L (ref 15–37)
BILIRUB SERPL-MCNC: 0.8 MG/DL (ref 0.2–1)
BUN SERPL-MCNC: 10 MG/DL (ref 6–20)
BUN/CREAT SERPL: 11 (ref 12–20)
CALCIUM SERPL-MCNC: 9.7 MG/DL (ref 8.5–10.1)
CHLORIDE SERPL-SCNC: 104 MMOL/L (ref 97–108)
CHOLEST SERPL-MCNC: 230 MG/DL
CK SERPL-CCNC: 92 U/L (ref 26–192)
CO2 SERPL-SCNC: 27 MMOL/L (ref 21–32)
CREAT SERPL-MCNC: 0.91 MG/DL (ref 0.55–1.02)
GLOBULIN SER CALC-MCNC: 3.1 G/DL (ref 2–4)
GLUCOSE SERPL-MCNC: 98 MG/DL (ref 65–100)
HDLC SERPL-MCNC: 65 MG/DL
HDLC SERPL: 3.5 {RATIO} (ref 0–5)
LDLC SERPL CALC-MCNC: 119.8 MG/DL (ref 0–100)
POTASSIUM SERPL-SCNC: 4.9 MMOL/L (ref 3.5–5.1)
PROT SERPL-MCNC: 7.3 G/DL (ref 6.4–8.2)
SODIUM SERPL-SCNC: 137 MMOL/L (ref 136–145)
TRIGL SERPL-MCNC: 226 MG/DL (ref ?–150)
TSH SERPL DL<=0.05 MIU/L-ACNC: 10.1 UIU/ML (ref 0.36–3.74)
VLDLC SERPL CALC-MCNC: 45.2 MG/DL

## 2021-09-16 PROCEDURE — 99214 OFFICE O/P EST MOD 30 MIN: CPT | Performed by: NURSE PRACTITIONER

## 2021-09-16 RX ORDER — ATORVASTATIN CALCIUM 20 MG/1
20 TABLET, FILM COATED ORAL DAILY
COMMUNITY
End: 2021-10-25 | Stop reason: SDUPTHER

## 2021-09-16 RX ORDER — DOXYCYCLINE 100 MG/1
TABLET ORAL 2 TIMES DAILY
COMMUNITY
End: 2021-12-30

## 2021-09-16 NOTE — PROGRESS NOTES
Chief Complaint   Patient presents with    Labs     3 month follow up/fasting labs       SUBJECTIVE:    Maritza Hernandez is a 45 y.o. female who is here today for a follow up appointment regarding her hypertension, hypercholesterolemia, hypothyroidism, and history of smoking. Patient states she was seen by cardiology by Dr. Stacey Bocanegra on 08/10/2021. At that time, she was taken off of her Zetia, and put on atorvastatin 20 mg due to her excessively high cholesterol levels. Since starting the medication she does admit to having some intermittent muscle aching which she attributes as a side effect of the medication. However, she is doing her best to tolerate the medication. She states she had a stress test done, told she \"passed,\" and was told to return in 6 months. She denies any chest pain, chest pressure, shortness of breath, headache, dizziness, or syncope episodes. The patient continues to take levothyroxine for management of her hypothyroidism. She continues to take medication for management of her hypertension, and denies any adverse side effects of either medication. She continues to smoke despite health risks. The patient also has concerns about the COVID-19 vaccination, as she has not been vaccinated yet. Current Outpatient Medications   Medication Sig Dispense Refill    cyanocobalamin (VITAMIN B12) 1,000 mcg/mL injection 1 mL by IntraMUSCular route every seven (7) days. Indications: anemia due to vitamin B12 deficiency-pernicious anemia, inadequate vitamin B12 12 Vial 3    fexofenadine-pseudoephedrine (Allegra-D 12 Hour)  mg Tb12 Take 1 Tablet by mouth every twelve (12) hours.  mometasone (Nasonex) 50 mcg/actuation nasal spray 2 Sprays daily.  diphenhydrAMINE (Benadryl Allergy) 25 mg tablet Take 25 mg by mouth every six (6) hours as needed.  emollient base (CREAM BASE EX) by Apply Externally route.       levothyroxine (Synthroid) 175 mcg tablet Take 1 Tablet by mouth Daily (before breakfast). Indications: a condition with low thyroid hormone levels 90 Tablet 3    lisinopril-hydroCHLOROthiazide (PRINZIDE, ZESTORETIC) 20-12.5 mg per tablet Take 1 Tablet by mouth daily. Indications: high blood pressure 90 Tablet 3    pantoprazole (PROTONIX) 40 mg tablet Take 1 Tablet by mouth daily. Indications: indigestion 90 Tablet 3    ezetimibe (ZETIA) 10 mg tablet Take 1 Tablet by mouth daily. (Patient not taking: Reported on 9/16/2021) 90 Tablet 1    Syringe with Needle, Disp, 3 mL 22 x 1 1/2\" syrg 1 Units by Does Not Apply route every seven (7) days. Indications: Use with B12 weekly 12 Syringe 3    loratadine (Claritin) 10 mg tablet Take 10 mg by mouth. (Patient not taking: Reported on 6/16/2021)       History reviewed. No pertinent past medical history. History reviewed. No pertinent surgical history.   Allergies   Allergen Reactions    Latex Other (comments)    Augmentin [Amoxicillin-Pot Clavulanate] Other (comments)    Bee Venom Protein (Honey Bee) Other (comments)    Erythromycin Other (comments)    Sulfur-8 Other (comments)       REVIEW OF SYSTEMS:                                        POSITIVE= bold text  Negative = regular text    General:                     fever, chills, sweats, generalized weakness, weight loss/gain,                                       loss of appetite   Eyes:                           blurred vision, eye pain, loss of vision, double vision  ENT:                            rhinorrhea, pharyngitis   Respiratory:               cough, sputum production, SOB, NAVAS, wheezing, pleuritic pain   Cardiology:                chest pain, palpitations, orthopnea, PND, edema, syncope   Gastrointestinal:       abdominal pain , N/V, diarrhea, dysphagia, constipation, bleeding   Genitourinary:           frequency, urgency, dysuria, hematuria, incontinence   Muskuloskeletal :      arthralgia, myalgia, back pain  Hematology:              easy bruising, nose or gum bleeding, lymphadenopathy   Dermatological:         rash, ulceration, pruritis, color change / jaundice  Endocrine:                 hot flashes or polydipsia   Neurological:             headache, dizziness, confusion, focal weakness, paresthesia,                                      Speech difficulties, memory loss, gait difficulty  Psychological:          Feelings of anxiety, depression, agitation        Social History     Socioeconomic History    Marital status:      Spouse name: Not on file    Number of children: Not on file    Years of education: Not on file    Highest education level: Not on file   Tobacco Use    Smoking status: Current Every Day Smoker     Types: Cigarettes    Smokeless tobacco: Never Used   Substance and Sexual Activity    Alcohol use: Yes     Social Determinants of Health     Financial Resource Strain:     Difficulty of Paying Living Expenses:    Food Insecurity:     Worried About Running Out of Food in the Last Year:     Ran Out of Food in the Last Year:    Transportation Needs:     Lack of Transportation (Medical):  Lack of Transportation (Non-Medical):    Physical Activity:     Days of Exercise per Week:     Minutes of Exercise per Session:    Stress:     Feeling of Stress :    Social Connections:     Frequency of Communication with Friends and Family:     Frequency of Social Gatherings with Friends and Family:     Attends Church Services:     Active Member of Clubs or Organizations:     Attends Club or Organization Meetings:     Marital Status:      History reviewed. No pertinent family history. OBJECTIVE:   Visit Vitals  /85 (BP 1 Location: Left upper arm, BP Patient Position: Sitting, BP Cuff Size: Small adult)   Pulse 75   Temp 98.6 °F (37 °C)   Resp 16   Ht 5' 10\" (1.778 m)   Wt 206 lb 12.8 oz (93.8 kg)   SpO2 99%   BMI 29.67 kg/m²     Constitutional: She appears well nourished, of stated age, and dressed appropriately.   Eyes: Sclera anicteric, PERRLA, EOMI  Neck: Supple without lymphadenopathy. Thyroid normal, No JVD or bruits  Respiratory: Clear to ascultation X5, normal inspiratory effort, no adventitious breath sounds. Cardiovascular: Regular rate and rhythm, no rubs or gallops, PMI not displaced, No thrills, no peripheral edema  Hematologic: No purpura, petechiae or unexplained bruising  Lymphatic: No lymph node enlargemant. Musculoskeletal: No joint pain or swelling on palpation. Integumentary: No unusual rashes or suspicious skin lesions noted. Neuro: Non-focal exam, A & O X 3,  Psychiatric: Appropriate affect and demeanor, pleasant and cooperative. Patient's thought content and thought processing appear to be within normal limits. ASSESSMENT/PLAN:     ICD-10-CM ICD-9-CM    1. Essential hypertension  I10 401.9 LIPID PANEL      METABOLIC PANEL, COMPREHENSIVE   2. Mixed hyperlipidemia  E78.2 272.2 LIPID PANEL      METABOLIC PANEL, COMPREHENSIVE   3. Myalgia  M79.10 729.1 CK   4. Acquired hypothyroidism  E03.9 244.9 TSH 3RD GENERATION   5. Tobacco abuse  Z72.0 305.1    6. Tobacco abuse counseling  Z71.6 V65.42      305.1    7. On statin therapy  O94.977 C66.92 METABOLIC PANEL, COMPREHENSIVE      CK   8. Vaccine counseling  Z71.89 V65.49      1: Patient to continue current medication for management of hypertension, hypercholesterolemia, and hypothyroidism. 2: Patient thoroughly counseled and educated on safety, risks, and benefits of COVID-19 vaccination. Patient states she will likely do this soon. 3: Patient to continue healthy lifestyle management including: Cessation of tobacco use, adequate amounts of fiber water, low-fat/low-cholesterol diet, avoidance of adding salt, and adequate amounts of exercise each week. 4: We will do labs today including: CMP, TSH, lipid panel, and CK.  5: Patient to follow-up with me in approximately 3 to 6 months depending on outcomes of labs.   Patient states understanding and agrees with plan.    ATTENTION:   This medical record was transcribed using an electronic medical records system. Although proofread, it may and can contain electronic and spelling errors. Other human spelling and other errors may be present. Corrections may be executed at a later time. Please feel free to contact us for any clarifications as needed. Signed,  Ana M Rodrigues.  LESLY Rodas APRN FNP-BC

## 2021-09-16 NOTE — PROGRESS NOTES
Mally Mims is a 45 y.o. female    Chief Complaint   Patient presents with    Labs     3 month follow up/fasting labs       Visit Vitals  /85 (BP 1 Location: Left upper arm, BP Patient Position: Sitting, BP Cuff Size: Small adult)   Pulse 75   Temp 98.6 °F (37 °C)   Resp 16   Ht 5' 10\" (1.778 m)   Wt 206 lb 12.8 oz (93.8 kg)   SpO2 99%   BMI 29.67 kg/m²           1. Have you been to the ER, urgent care clinic since your last visit? Hospitalized since your last visit? NO    2. Have you seen or consulted any other health care providers outside of the [address] [place] [address] since your last visit? Include any pap smears or colon screening.  NO

## 2021-09-17 RX ORDER — LEVOTHYROXINE SODIUM 200 UG/1
200 TABLET ORAL
Qty: 90 TABLET | Refills: 0 | Status: SHIPPED | OUTPATIENT
Start: 2021-09-17 | End: 2021-12-01 | Stop reason: SDUPTHER

## 2021-09-17 NOTE — PROGRESS NOTES
Your CK, a marker for inflammation of muscles, is currently in a normal range. Good. Kidney function, liver functions, and electrolytes are acceptable. Your thyroid appears to be underfunctioning even with your levothyroxine 175 mcg a day. I am going to increase you to 200 mcg a day. Please take this each morning on an empty stomach at least 30 minutes before eating any food. We need to recheck your levels in 6 weeks. Please make a lab-only appointment for this. Cholesterol levels have come down substantially, but remain elevated. Continue the atorvastatin each day for now. We need to check this again in 3 months.

## 2021-11-08 ENCOUNTER — LAB ONLY (OUTPATIENT)
Dept: INTERNAL MEDICINE CLINIC | Age: 51
End: 2021-11-08

## 2021-11-08 DIAGNOSIS — E03.9 ACQUIRED HYPOTHYROIDISM: ICD-10-CM

## 2021-11-08 DIAGNOSIS — Z79.899 ON STATIN THERAPY: ICD-10-CM

## 2021-11-08 DIAGNOSIS — E78.2 MIXED HYPERLIPIDEMIA: Primary | ICD-10-CM

## 2021-11-08 LAB
CHOLEST SERPL-MCNC: 245 MG/DL
HDLC SERPL-MCNC: 57 MG/DL
HDLC SERPL: 4.3 {RATIO} (ref 0–5)
LDLC SERPL CALC-MCNC: 120.6 MG/DL (ref 0–100)
TRIGL SERPL-MCNC: 337 MG/DL (ref ?–150)
TSH SERPL DL<=0.05 MIU/L-ACNC: 3.07 UIU/ML (ref 0.36–3.74)
VLDLC SERPL CALC-MCNC: 67.4 MG/DL

## 2021-11-09 NOTE — PROGRESS NOTES
Thyroid level is improved, and now down in normal range. Continue current dose of levothyroxine. Cholesterol levels are a bit elevated. Continue to take medication as prescribed and remain watchful of diet. Avoid fatty foods and high cholesterol foods.

## 2021-12-01 DIAGNOSIS — E03.9 ACQUIRED HYPOTHYROIDISM: ICD-10-CM

## 2021-12-01 RX ORDER — LEVOTHYROXINE SODIUM 200 UG/1
200 TABLET ORAL
Qty: 90 TABLET | Refills: 1 | Status: SHIPPED | OUTPATIENT
Start: 2021-12-01 | End: 2022-04-01 | Stop reason: SDUPTHER

## 2021-12-01 NOTE — TELEPHONE ENCOUNTER
Last Refill: 09/17/21  Last Visit: 9/16/2021   Next Visit: 12/30/2021    Requested Prescriptions     Pending Prescriptions Disp Refills    levothyroxine (SYNTHROID) 200 mcg tablet 90 Tablet 1     Sig: Take 1 Tablet by mouth Daily (before breakfast).  Indications: a condition with low thyroid hormone levels

## 2021-12-30 ENCOUNTER — OFFICE VISIT (OUTPATIENT)
Dept: INTERNAL MEDICINE CLINIC | Age: 51
End: 2021-12-30
Payer: COMMERCIAL

## 2021-12-30 VITALS
OXYGEN SATURATION: 96 % | HEIGHT: 70 IN | WEIGHT: 204 LBS | HEART RATE: 82 BPM | DIASTOLIC BLOOD PRESSURE: 84 MMHG | RESPIRATION RATE: 18 BRPM | SYSTOLIC BLOOD PRESSURE: 138 MMHG | BODY MASS INDEX: 29.2 KG/M2

## 2021-12-30 DIAGNOSIS — E03.9 ACQUIRED HYPOTHYROIDISM: Primary | ICD-10-CM

## 2021-12-30 DIAGNOSIS — I10 ESSENTIAL HYPERTENSION: ICD-10-CM

## 2021-12-30 DIAGNOSIS — E53.8 B12 DEFICIENCY: ICD-10-CM

## 2021-12-30 DIAGNOSIS — Z79.899 ON STATIN THERAPY: ICD-10-CM

## 2021-12-30 DIAGNOSIS — D51.0 PERNICIOUS ANEMIA: ICD-10-CM

## 2021-12-30 DIAGNOSIS — E78.2 MIXED HYPERLIPIDEMIA: ICD-10-CM

## 2021-12-30 DIAGNOSIS — K21.9 GASTROESOPHAGEAL REFLUX DISEASE WITHOUT ESOPHAGITIS: ICD-10-CM

## 2021-12-30 PROCEDURE — 99214 OFFICE O/P EST MOD 30 MIN: CPT | Performed by: NURSE PRACTITIONER

## 2021-12-30 NOTE — PROGRESS NOTES
Chief Complaint   Patient presents with    Follow-up     3 month f/u       SUBJECTIVE:    Anne Marie Diaz is a 45 y.o. female who is here today for a follow up appointment regarding her hypothyroidism, hypertension, hypercholesterolemia, B12 deficiency, pernicious anemia and GERD. She denies any new or acute complaints at this time. She states she is feeling fairly well overall. The patient continues to take levothyroxine as prescribed for management of her hypothyroidism. She was found to have an elevated TSH in October, I adjusted her medication, and was rechecked in November and was within normal limits. She states she can feel the difference overall. She denies any hot/cold intolerance, and has had no appreciable weight gain. She continues to take her medication for management of her hypertension and hypercholesterolemia. She denies any adverse side effects to medication. She denies any chest pain, chest pressure, shortness of breath, headaches, dizziness, blurred vision, palpitations, or syncope episodes. She is currently using B12 injections for management of her pernicious anemia and B12 deficiency. She is giving herself injections once a month. She has tolerated medication well. She continues to use Protonix daily for management of her GERD. She states this is well controlled at this time. Current Outpatient Medications   Medication Sig Dispense Refill    levothyroxine (SYNTHROID) 200 mcg tablet Take 1 Tablet by mouth Daily (before breakfast). Indications: a condition with low thyroid hormone levels 90 Tablet 1    atorvastatin (LIPITOR) 20 mg tablet Take 1 Tablet by mouth daily. 90 Tablet 1    doxycycline (ADOXA) 100 mg tablet Take  by mouth two (2) times a day. 10 mg tablet      cyanocobalamin (VITAMIN B12) 1,000 mcg/mL injection 1 mL by IntraMUSCular route every seven (7) days.  Indications: anemia due to vitamin B12 deficiency-pernicious anemia, inadequate vitamin B12 12 Vial 3    fexofenadine-pseudoephedrine (Allegra-D 12 Hour)  mg Tb12 Take 1 Tablet by mouth every twelve (12) hours.  ezetimibe (ZETIA) 10 mg tablet Take 1 Tablet by mouth daily. 90 Tablet 1    Syringe with Needle, Disp, 3 mL 22 x 1 1/2\" syrg 1 Units by Does Not Apply route every seven (7) days. Indications: Use with B12 weekly 12 Syringe 3    mometasone (Nasonex) 50 mcg/actuation nasal spray 2 Sprays daily.  loratadine (Claritin) 10 mg tablet Take 10 mg by mouth.  diphenhydrAMINE (Benadryl Allergy) 25 mg tablet Take 25 mg by mouth every six (6) hours as needed.  emollient base (CREAM BASE EX) by Apply Externally route.  lisinopril-hydroCHLOROthiazide (PRINZIDE, ZESTORETIC) 20-12.5 mg per tablet Take 1 Tablet by mouth daily. Indications: high blood pressure 90 Tablet 3    pantoprazole (PROTONIX) 40 mg tablet Take 1 Tablet by mouth daily. Indications: indigestion 90 Tablet 3     History reviewed. No pertinent past medical history. History reviewed. No pertinent surgical history.   Allergies   Allergen Reactions    Latex Other (comments) and Hives    Amoxicillin-Pot Clavulanate Other (comments)    Beeswax Anaphylaxis    Bee Venom Protein (Honey Bee) Other (comments)    Erythromycin Other (comments)    Sulfur-8 Other (comments)       REVIEW OF SYSTEMS:                                        POSITIVE= bold text  Negative = regular text    General:                     fever, chills, sweats, generalized weakness, weight loss/gain,                                       loss of appetite   Eyes:                           blurred vision, eye pain, loss of vision, double vision  ENT:                            rhinorrhea, pharyngitis   Respiratory:               cough, sputum production, SOB, NAVAS, wheezing, pleuritic pain   Cardiology:                chest pain, palpitations, orthopnea, PND, edema, syncope   Gastrointestinal:       abdominal pain , N/V, diarrhea, dysphagia, constipation, bleeding   Genitourinary:           frequency, urgency, dysuria, hematuria, incontinence   Muskuloskeletal :      arthralgia, myalgia, back pain  Hematology:              easy bruising, nose or gum bleeding, lymphadenopathy   Dermatological:         rash, ulceration, pruritis, color change / jaundice  Endocrine:                 hot flashes or polydipsia   Neurological:             headache, dizziness, confusion, focal weakness, paresthesia,                                      Speech difficulties, memory loss, gait difficulty  Psychological:          Feelings of anxiety, depression, agitation        Social History     Socioeconomic History    Marital status:    Tobacco Use    Smoking status: Current Every Day Smoker     Types: Cigarettes    Smokeless tobacco: Never Used   Substance and Sexual Activity    Alcohol use: Yes     History reviewed. No pertinent family history. OBJECTIVE:     Visit Vitals  /84 (BP 1 Location: Left arm, BP Patient Position: Sitting, BP Cuff Size: Large adult)   Pulse 82   Resp 18   Ht 5' 10\" (1.778 m)   Wt 204 lb (92.5 kg)   SpO2 96%   BMI 29.27 kg/m²       Constitutional: She appears well nourished, of stated age, and dressed appropriately. Eyes: Sclera anicteric, PERRLA, EOMI  ENT: Nares clear, moist mucous membranes, pharynx clear  Neck: Supple without lymphadenopathy. Thyroid normal, No JVD or bruits  Respiratory: Clear to ascultation X5, normal inspiratory effort, no adventitious breath sounds. Cardiovascular: Regular rate and rhythm, no murmurs, rubs or gallops, PMI not displaced, No thrills, no peripheral edema  Neuro: Non-focal exam, A & O X 3.  Psychiatric: Appropriate affect and demeanor, pleasant and cooperative. Patient's thought content and thought processing appear to be within normal limits. ASSESSMENT/PLAN:     ICD-10-CM ICD-9-CM    1. Acquired hypothyroidism  E03.9 244.9    2. Mixed hyperlipidemia  E78.2 272.2    3.  On statin therapy  Z79.899 V58.69    4. Essential hypertension  I10 401.9    5. B12 deficiency  E53.8 266.2    6. Pernicious anemia  D51.0 281.0    7. Gastroesophageal reflux disease without esophagitis  K21.9 530.81      1: Patient to continue current medications for management of hypothyroidism, mixed hyperlipidemia, essential hypertension, and B12 deficiency/pernicious anemia.  2: Patient to continue Protonix daily for management of GERD. Avoid offending foods. 3: Patient to continue healthy lifestyle management including: Low-fat/low-cholesterol diet, adequate amounts of fiber water, and exercise as tolerated. 4: Patient to follow-up with me in approximately 3 months, for fasting labs or sooner as needed. Patient states understanding and agrees with plan. ATTENTION:   This medical record was transcribed using an electronic medical records system. Although proofread, it may and can contain electronic and spelling errors. Other human spelling and other errors may be present. Corrections may be executed at a later time. Please feel free to contact us for any clarifications as needed. Signed,  Carolyn Chang, MSN APRN FNP-BC

## 2021-12-30 NOTE — PROGRESS NOTES
1. Have you been to the ER, urgent care clinic since your last visit? Hospitalized since your last visit? No    2. Have you seen or consulted any other health care providers outside of the [address] [place] [address] since your last visit? Include any pap smears or colon screening.  No

## 2022-01-11 DIAGNOSIS — D51.0 PERNICIOUS ANEMIA: ICD-10-CM

## 2022-01-11 DIAGNOSIS — E53.8 B12 DEFICIENCY: ICD-10-CM

## 2022-01-11 RX ORDER — SYRINGE W-NEEDLE,DISPOSAB,3 ML 25GX5/8"
1 SYRINGE, EMPTY DISPOSABLE MISCELLANEOUS
Qty: 12 EACH | Refills: 1 | Status: SHIPPED | OUTPATIENT
Start: 2022-01-11 | End: 2022-04-01 | Stop reason: SDUPTHER

## 2022-02-16 RX ORDER — ATORVASTATIN CALCIUM 20 MG/1
TABLET, FILM COATED ORAL
Qty: 90 TABLET | Refills: 1 | Status: SHIPPED | OUTPATIENT
Start: 2022-02-16 | End: 2022-04-01 | Stop reason: SDUPTHER

## 2022-03-19 PROBLEM — Z91.89 AT HIGH RISK FOR BREAST CANCER: Status: ACTIVE | Noted: 2021-07-01

## 2022-04-01 ENCOUNTER — OFFICE VISIT (OUTPATIENT)
Dept: INTERNAL MEDICINE CLINIC | Age: 52
End: 2022-04-01
Payer: COMMERCIAL

## 2022-04-01 VITALS
DIASTOLIC BLOOD PRESSURE: 70 MMHG | BODY MASS INDEX: 27.89 KG/M2 | OXYGEN SATURATION: 96 % | HEART RATE: 89 BPM | RESPIRATION RATE: 16 BRPM | SYSTOLIC BLOOD PRESSURE: 120 MMHG | WEIGHT: 194.8 LBS | TEMPERATURE: 98.6 F | HEIGHT: 70 IN

## 2022-04-01 DIAGNOSIS — I10 ESSENTIAL HYPERTENSION: Primary | ICD-10-CM

## 2022-04-01 DIAGNOSIS — E53.8 B12 DEFICIENCY: ICD-10-CM

## 2022-04-01 DIAGNOSIS — J30.89 ENVIRONMENTAL AND SEASONAL ALLERGIES: ICD-10-CM

## 2022-04-01 DIAGNOSIS — D51.0 PERNICIOUS ANEMIA: ICD-10-CM

## 2022-04-01 DIAGNOSIS — Z79.899 ON STATIN THERAPY: ICD-10-CM

## 2022-04-01 DIAGNOSIS — K21.9 GASTROESOPHAGEAL REFLUX DISEASE WITHOUT ESOPHAGITIS: ICD-10-CM

## 2022-04-01 DIAGNOSIS — E03.9 ACQUIRED HYPOTHYROIDISM: ICD-10-CM

## 2022-04-01 DIAGNOSIS — E78.2 MIXED HYPERLIPIDEMIA: ICD-10-CM

## 2022-04-01 LAB
ALBUMIN SERPL-MCNC: 4 G/DL (ref 3.5–5)
ALBUMIN/GLOB SERPL: 1.3 {RATIO} (ref 1.1–2.2)
ALP SERPL-CCNC: 114 U/L (ref 45–117)
ALT SERPL-CCNC: 34 U/L (ref 12–78)
ANION GAP SERPL CALC-SCNC: 6 MMOL/L (ref 5–15)
AST SERPL-CCNC: 21 U/L (ref 15–37)
BILIRUB SERPL-MCNC: 0.5 MG/DL (ref 0.2–1)
BUN SERPL-MCNC: 4 MG/DL (ref 6–20)
BUN/CREAT SERPL: 5 (ref 12–20)
CALCIUM SERPL-MCNC: 9.5 MG/DL (ref 8.5–10.1)
CHLORIDE SERPL-SCNC: 95 MMOL/L (ref 97–108)
CHOLEST SERPL-MCNC: 214 MG/DL
CO2 SERPL-SCNC: 29 MMOL/L (ref 21–32)
CREAT SERPL-MCNC: 0.75 MG/DL (ref 0.55–1.02)
GLOBULIN SER CALC-MCNC: 3 G/DL (ref 2–4)
GLUCOSE SERPL-MCNC: 92 MG/DL (ref 65–100)
HDLC SERPL-MCNC: 62 MG/DL
HDLC SERPL: 3.5 {RATIO} (ref 0–5)
LDLC SERPL CALC-MCNC: 121.6 MG/DL (ref 0–100)
POTASSIUM SERPL-SCNC: 5 MMOL/L (ref 3.5–5.1)
PROT SERPL-MCNC: 7 G/DL (ref 6.4–8.2)
SODIUM SERPL-SCNC: 130 MMOL/L (ref 136–145)
TRIGL SERPL-MCNC: 152 MG/DL (ref ?–150)
TSH SERPL DL<=0.05 MIU/L-ACNC: 0.05 UIU/ML (ref 0.36–3.74)
VLDLC SERPL CALC-MCNC: 30.4 MG/DL

## 2022-04-01 PROCEDURE — 99214 OFFICE O/P EST MOD 30 MIN: CPT | Performed by: NURSE PRACTITIONER

## 2022-04-01 RX ORDER — ATORVASTATIN CALCIUM 20 MG/1
TABLET, FILM COATED ORAL
Qty: 90 TABLET | Refills: 1 | Status: SHIPPED | OUTPATIENT
Start: 2022-04-01 | End: 2022-05-16 | Stop reason: SDUPTHER

## 2022-04-01 RX ORDER — SYRINGE W-NEEDLE,DISPOSAB,3 ML 25GX5/8"
1 SYRINGE, EMPTY DISPOSABLE MISCELLANEOUS
Qty: 12 EACH | Refills: 1 | Status: SHIPPED | OUTPATIENT
Start: 2022-04-01

## 2022-04-01 RX ORDER — LISINOPRIL AND HYDROCHLOROTHIAZIDE 12.5; 2 MG/1; MG/1
1 TABLET ORAL DAILY
Qty: 90 TABLET | Refills: 1 | Status: SHIPPED | OUTPATIENT
Start: 2022-04-01 | End: 2022-05-16 | Stop reason: SDUPTHER

## 2022-04-01 RX ORDER — PANTOPRAZOLE SODIUM 40 MG/1
40 TABLET, DELAYED RELEASE ORAL DAILY
Qty: 90 TABLET | Refills: 1 | Status: SHIPPED | OUTPATIENT
Start: 2022-04-01 | End: 2022-08-15

## 2022-04-01 RX ORDER — LEVOTHYROXINE SODIUM 200 UG/1
200 TABLET ORAL
Qty: 90 TABLET | Refills: 1 | Status: SHIPPED | OUTPATIENT
Start: 2022-04-01 | End: 2022-05-16 | Stop reason: SDUPTHER

## 2022-04-01 RX ORDER — EZETIMIBE 10 MG/1
10 TABLET ORAL DAILY
Qty: 90 TABLET | Refills: 1 | Status: SHIPPED | OUTPATIENT
Start: 2022-04-01 | End: 2022-10-04 | Stop reason: SDUPTHER

## 2022-04-01 RX ORDER — AZELASTINE HYDROCHLORIDE, FLUTICASONE PROPIONATE 137; 50 UG/1; UG/1
1 SPRAY, METERED NASAL 2 TIMES DAILY
Qty: 69 G | Refills: 3 | Status: SHIPPED | OUTPATIENT
Start: 2022-04-01

## 2022-04-01 NOTE — PROGRESS NOTES
Chief Complaint   Patient presents with    Follow-up     3 month follow up       SUBJECTIVE:    Sudhakar Cedillo is a 45 y.o. female who is here today for a follow up appointment regarding her hypertension, mixed hyperlipidemia, hypothyroidism, environmental and seasonal allergies, GERD, pernicious anemia/B12 deficiency, and long-term use of statin medication. She states she is feeling fairly well overall, denies any new or acute complaints. Patient continues to take medication for management of her hypertension, cholesterol, and hypothyroidism. She states she is tolerating these well, denies any adverse side effects. She states she is try to be watchful of her diet. She denies any chest pain, chest pressure, shortness of breath, headaches, dizziness, blurred vision, palpitations, or syncope episodes. She continues to smoke despite health risks. In addition, the patient continues to take medication for management of her GERD and is tolerating this well. She denies any GI issues, bowel habit changes, black tarry stools, or blood in her stools. She states her allergies have gotten worse over the past couple of weeks, and is worsened seasonally. She is currently taking over-the-counter Allegra as well as intranasal steroids which she states is not working as well as she would like. She continues to have a great deal of runny nose issues and occasional bleeding especially in the mornings. She continues to take B12 due to deficiency. She is requesting refills of her syringes today as well as other medications. Current Outpatient Medications   Medication Sig Dispense Refill    Syringe with Needle, Disp, 3 mL 22 x 1 1/2\" syrg 1 Units by Does Not Apply route every seven (7) days. Indications: Use with B12 weekly 12 Each 1    azelastine-fluticasone (Dymista) 137-50 mcg/spray spry 1 Spray by Nasal route two (2) times a day.  Indications: seasonal runny nose 69 g 3    atorvastatin (LIPITOR) 20 mg tablet TAKE 1 TABLET DAILY 90 Tablet 1    levothyroxine (SYNTHROID) 200 mcg tablet Take 1 Tablet by mouth Daily (before breakfast). Indications: a condition with low thyroid hormone levels 90 Tablet 1    cyanocobalamin (VITAMIN B12) 1,000 mcg/mL injection 1 mL by IntraMUSCular route every seven (7) days. Indications: anemia due to vitamin B12 deficiency-pernicious anemia, inadequate vitamin B12 12 Vial 3    fexofenadine-pseudoephedrine (Allegra-D 12 Hour)  mg Tb12 Take 1 Tablet by mouth every twelve (12) hours.  ezetimibe (ZETIA) 10 mg tablet Take 1 Tablet by mouth daily. 90 Tablet 1    mometasone (Nasonex) 50 mcg/actuation nasal spray 2 Sprays daily.  diphenhydrAMINE (Benadryl Allergy) 25 mg tablet Take 25 mg by mouth every six (6) hours as needed.  lisinopril-hydroCHLOROthiazide (PRINZIDE, ZESTORETIC) 20-12.5 mg per tablet Take 1 Tablet by mouth daily. Indications: high blood pressure 90 Tablet 3    pantoprazole (PROTONIX) 40 mg tablet Take 1 Tablet by mouth daily. Indications: indigestion 90 Tablet 3    loratadine (Claritin) 10 mg tablet Take 10 mg by mouth. (Patient not taking: Reported on 4/1/2022)      emollient base (CREAM BASE EX) by Apply Externally route. (Patient not taking: Reported on 4/1/2022)       History reviewed. No pertinent past medical history. History reviewed. No pertinent surgical history.   Allergies   Allergen Reactions    Latex Other (comments) and Hives    Amoxicillin-Pot Clavulanate Other (comments)    Beeswax Anaphylaxis    Bee Venom Protein (Honey Bee) Other (comments)    Erythromycin Other (comments)    Sulfur-8 Other (comments)       REVIEW OF SYSTEMS:                                        POSITIVE= bold text  Negative = regular text    General:                     fever, chills, sweats, generalized weakness, weight loss/gain,                                       loss of appetite   Eyes:                           blurred vision, eye pain, loss of vision, double vision  ENT:                            rhinorrhea, pharyngitis   Respiratory:               cough, sputum production, SOB, NAVAS, wheezing, pleuritic pain   Cardiology:                chest pain, palpitations, orthopnea, PND, edema, syncope   Gastrointestinal:       abdominal pain , N/V, diarrhea, dysphagia, constipation, bleeding   Genitourinary:           frequency, urgency, dysuria, hematuria, incontinence   Muskuloskeletal :      arthralgia, myalgia, back pain  Hematology:              easy bruising, nose or gum bleeding, lymphadenopathy   Dermatological:         rash, ulceration, pruritis, color change / jaundice  Endocrine:                 hot flashes or polydipsia   Neurological:             headache, dizziness, confusion, focal weakness, paresthesia,                                      Speech difficulties, memory loss, gait difficulty  Psychological:          Feelings of anxiety, depression, agitation        Social History     Socioeconomic History    Marital status:    Tobacco Use    Smoking status: Current Every Day Smoker     Packs/day: 0.50     Years: 40.00     Pack years: 20.00     Types: Cigarettes    Smokeless tobacco: Never Used   Vaping Use    Vaping Use: Never used   Substance and Sexual Activity    Alcohol use: Yes     Comment: socially    Drug use: Never     History reviewed. No pertinent family history. OBJECTIVE:     Visit Vitals  /70 (BP 1 Location: Left upper arm, BP Patient Position: Sitting, BP Cuff Size: Large adult)   Pulse 89   Temp 98.6 °F (37 °C)   Resp 16   Ht 5' 10\" (1.778 m)   Wt 194 lb 12.8 oz (88.4 kg)   SpO2 96%   BMI 27.95 kg/m²       Constitutional: She appears well nourished, of stated age, and dressed appropriately. Eyes: Sclera anicteric, PERRLA, EOMI  Neck: Supple without lymphadenopathy. Thyroid normal, No JVD or bruits  Respiratory: Clear to ascultation X5, normal inspiratory effort, no adventitious breath sounds.   Cardiovascular: Regular rate and rhythm, no murmurs, rubs or gallops, PMI not displaced, No thrills, no peripheral edema  Neuro: Non-focal exam, A & O X 3.   Psychiatric: Appropriate affect and demeanor, pleasant and cooperative. Patient's thought content and thought processing appear to be within normal limits. ASSESSMENT/PLAN:     ICD-10-CM ICD-9-CM    1. Essential hypertension  K18 352.1 METABOLIC PANEL, COMPREHENSIVE      METABOLIC PANEL, COMPREHENSIVE      lisinopril-hydroCHLOROthiazide (PRINZIDE, ZESTORETIC) 20-12.5 mg per tablet   2. Mixed hyperlipidemia  E78.2 272.2 LIPID PANEL      LIPID PANEL      atorvastatin (LIPITOR) 20 mg tablet      ezetimibe (ZETIA) 10 mg tablet   3. Acquired hypothyroidism  E03.9 244.9 TSH 3RD GENERATION      TSH 3RD GENERATION      levothyroxine (SYNTHROID) 200 mcg tablet   4. Environmental and seasonal allergies  J30.89 477.8 azelastine-fluticasone (Dymista) 137-50 mcg/spray spry   5. Gastroesophageal reflux disease without esophagitis  K21.9 530.81 pantoprazole (PROTONIX) 40 mg tablet   6. Pernicious anemia  D51.0 281.0 Syringe with Needle, Disp, 3 mL 22 x 1 1/2\" syrg   7. B12 deficiency  E53.8 266.2 Syringe with Needle, Disp, 3 mL 22 x 1 1/2\" syrg   8. On statin therapy  Z79.899 V58.69      1: Continue current medications for management of hypertension, hyperlipidemia, and hypothyroidism. 2: We will repeat labs today including: CMP, lipid panel, and TSH.  3: Patient to start Dymista nasal spray 1 squirt each nostril twice daily. Continue Allegra. 4: Continue pantoprazole for management of GERD. 5: Continue B12 injections due to deficiency. 6: Continue healthy lifestyle management. 7: Patient to follow-up with me in approximately 6 months, or sooner as needed. Patient states understanding and agrees with plan.     Orders Placed This Encounter    METABOLIC PANEL, COMPREHENSIVE    LIPID PANEL    TSH 3RD GENERATION    Syringe with Needle, Disp, 3 mL 22 x 1 1/2\" syrg    azelastine-fluticasone (Dymista) 137-50 mcg/spray spry         ATTENTION:   This medical record was transcribed using an electronic medical records system. Although proofread, it may and can contain electronic and spelling errors. Other human spelling and other errors may be present. Corrections may be executed at a later time. Please feel free to contact us for any clarifications as needed. Signed,  Arline Agrawal.  Kristel Braun MSN APRN FNP-BC

## 2022-04-01 NOTE — PROGRESS NOTES
Ladmarisabel Troy is a 45 y.o. female    Chief Complaint   Patient presents with    Follow-up     3 month follow up       Visit Vitals  /70 (BP 1 Location: Left upper arm, BP Patient Position: Sitting, BP Cuff Size: Large adult)   Pulse 89   Temp 98.6 °F (37 °C)   Resp 16   Ht 5' 10\" (1.778 m)   Wt 194 lb 12.8 oz (88.4 kg)   SpO2 96%   BMI 27.95 kg/m²           1. Have you been to the ER, urgent care clinic since your last visit? Hospitalized since your last visit? NO    2. Have you seen or consulted any other health care providers outside of the [address] [place] [address] since your last visit? Include any pap smears or colon screening.  NO

## 2022-04-04 NOTE — PROGRESS NOTES
Presently, your thyroid appears a bit too suppressed by your medication. I would like for you to continue taking 200 mcg each day, but take 1/2 tablet only each Wednesday. We will need to recheck this again in 3 to 6 months. Cholesterol levels remain somewhat elevated. Can we increase your atorvastatin? If so, try taking 2 tablets of the atorvastatin each day and see how this goes. If you tolerate it well, then I will increase it to 20 mg daily.

## 2022-04-06 NOTE — PROGRESS NOTES
Pt agrees. No questions. Re:  Presently, your thyroid appears a bit too suppressed by your medication.  I would like for you to continue taking 200 mcg each day, but take 1/2 tablet only each Wednesday.  We will need to recheck this again in 3 to 6 months. Cholesterol levels remain somewhat elevated.  Can we increase your atorvastatin?  If so, try taking 2 tablets of the atorvastatin each day and see how this goes.  If you tolerate it well, then I will increase it to 20 mg daily.

## 2022-04-26 DIAGNOSIS — E53.8 B12 DEFICIENCY: ICD-10-CM

## 2022-04-26 DIAGNOSIS — D51.0 PERNICIOUS ANEMIA: ICD-10-CM

## 2022-04-27 RX ORDER — CYANOCOBALAMIN 1000 UG/ML
INJECTION, SOLUTION INTRAMUSCULAR; SUBCUTANEOUS
Qty: 12 ML | Refills: 3 | Status: SHIPPED | OUTPATIENT
Start: 2022-04-27 | End: 2022-10-04 | Stop reason: SDUPTHER

## 2022-05-16 DIAGNOSIS — E03.9 ACQUIRED HYPOTHYROIDISM: ICD-10-CM

## 2022-05-16 DIAGNOSIS — E78.2 MIXED HYPERLIPIDEMIA: ICD-10-CM

## 2022-05-16 DIAGNOSIS — I10 ESSENTIAL HYPERTENSION: ICD-10-CM

## 2022-05-16 RX ORDER — ATORVASTATIN CALCIUM 20 MG/1
TABLET, FILM COATED ORAL
Qty: 90 TABLET | Refills: 1 | Status: SHIPPED | OUTPATIENT
Start: 2022-05-16 | End: 2022-10-04 | Stop reason: SDUPTHER

## 2022-05-16 RX ORDER — LEVOTHYROXINE SODIUM 200 UG/1
200 TABLET ORAL
Qty: 90 TABLET | Refills: 1 | Status: SHIPPED | OUTPATIENT
Start: 2022-05-16 | End: 2022-10-04 | Stop reason: SDUPTHER

## 2022-05-16 RX ORDER — LISINOPRIL AND HYDROCHLOROTHIAZIDE 12.5; 2 MG/1; MG/1
1 TABLET ORAL DAILY
Qty: 90 TABLET | Refills: 1 | Status: SHIPPED | OUTPATIENT
Start: 2022-05-16 | End: 2022-10-04 | Stop reason: SDUPTHER

## 2022-05-16 NOTE — TELEPHONE ENCOUNTER
PCP: Julio César Stark NP    Last appt: 4/1/2022  Future Appointments   Date Time Provider Marshall Gil   10/4/2022  8:30 AM Julio César Stark NP [facility] AMB       Requested Prescriptions     Pending Prescriptions Disp Refills    atorvastatin (LIPITOR) 20 mg tablet 90 Tablet 1     Sig: TAKE 1 TABLET DAILY    levothyroxine (SYNTHROID) 200 mcg tablet 90 Tablet 1     Sig: Take 1 Tablet by mouth Daily (before breakfast). Indications: a condition with low thyroid hormone levels    lisinopril-hydroCHLOROthiazide (PRINZIDE, ZESTORETIC) 20-12.5 mg per tablet 90 Tablet 1     Sig: Take 1 Tablet by mouth daily.  Indications: high blood pressure         Other Comments:

## 2022-05-16 NOTE — TELEPHONE ENCOUNTER
PCP: Shyann Garcia NP    Last appt: 4/1/2022  Future Appointments   Date Time Provider Marshall Gil   10/4/2022  8:30 AM Shyann Garcia NP [facility] AMB       Requested Prescriptions     Pending Prescriptions Disp Refills    atorvastatin (LIPITOR) 20 mg tablet 90 Tablet 1     Sig: TAKE 1 TABLET DAILY    levothyroxine (SYNTHROID) 200 mcg tablet 90 Tablet 1     Sig: Take 1 Tablet by mouth Daily (before breakfast). Indications: a condition with low thyroid hormone levels    lisinopril-hydroCHLOROthiazide (PRINZIDE, ZESTORETIC) 20-12.5 mg per tablet 90 Tablet 1     Sig: Take 1 Tablet by mouth daily.  Indications: high blood pressure       Prior labs and Blood pressures:  BP Readings from Last 3 Encounters:   04/01/22 120/70   12/30/21 138/84   09/16/21 119/85     Lab Results   Component Value Date/Time    Sodium 130 (L) 04/01/2022 09:07 AM    Potassium 5.0 04/01/2022 09:07 AM    Chloride 95 (L) 04/01/2022 09:07 AM    CO2 29 04/01/2022 09:07 AM    Anion gap 6 04/01/2022 09:07 AM    Glucose 92 04/01/2022 09:07 AM    BUN 4 (L) 04/01/2022 09:07 AM    Creatinine 0.75 04/01/2022 09:07 AM    BUN/Creatinine ratio 5 (L) 04/01/2022 09:07 AM    GFR est AA >60 04/01/2022 09:07 AM    GFR est non-AA >60 04/01/2022 09:07 AM    Calcium 9.5 04/01/2022 09:07 AM

## 2022-08-14 DIAGNOSIS — K21.9 GASTROESOPHAGEAL REFLUX DISEASE WITHOUT ESOPHAGITIS: ICD-10-CM

## 2022-08-15 RX ORDER — PANTOPRAZOLE SODIUM 40 MG/1
TABLET, DELAYED RELEASE ORAL
Qty: 90 TABLET | Refills: 1 | Status: SHIPPED | OUTPATIENT
Start: 2022-08-15 | End: 2022-10-04 | Stop reason: SDUPTHER

## 2022-10-04 ENCOUNTER — OFFICE VISIT (OUTPATIENT)
Dept: INTERNAL MEDICINE CLINIC | Age: 52
End: 2022-10-04
Payer: COMMERCIAL

## 2022-10-04 VITALS
DIASTOLIC BLOOD PRESSURE: 72 MMHG | WEIGHT: 199 LBS | BODY MASS INDEX: 28.49 KG/M2 | TEMPERATURE: 97.7 F | OXYGEN SATURATION: 97 % | SYSTOLIC BLOOD PRESSURE: 126 MMHG | HEART RATE: 83 BPM | HEIGHT: 70 IN | RESPIRATION RATE: 16 BRPM

## 2022-10-04 DIAGNOSIS — E78.2 MIXED HYPERLIPIDEMIA: ICD-10-CM

## 2022-10-04 DIAGNOSIS — I10 ESSENTIAL HYPERTENSION: ICD-10-CM

## 2022-10-04 DIAGNOSIS — E03.9 ACQUIRED HYPOTHYROIDISM: ICD-10-CM

## 2022-10-04 DIAGNOSIS — E53.8 B12 DEFICIENCY: ICD-10-CM

## 2022-10-04 DIAGNOSIS — K21.9 GASTROESOPHAGEAL REFLUX DISEASE WITHOUT ESOPHAGITIS: ICD-10-CM

## 2022-10-04 DIAGNOSIS — E87.1 HYPONATREMIA: Primary | ICD-10-CM

## 2022-10-04 DIAGNOSIS — Z79.899 ON STATIN THERAPY: ICD-10-CM

## 2022-10-04 DIAGNOSIS — D51.0 PERNICIOUS ANEMIA: ICD-10-CM

## 2022-10-04 PROCEDURE — 99214 OFFICE O/P EST MOD 30 MIN: CPT | Performed by: NURSE PRACTITIONER

## 2022-10-04 RX ORDER — LEVOTHYROXINE SODIUM 200 UG/1
200 TABLET ORAL
Qty: 90 TABLET | Refills: 1 | Status: SHIPPED | OUTPATIENT
Start: 2022-10-04

## 2022-10-04 RX ORDER — PANTOPRAZOLE SODIUM 40 MG/1
TABLET, DELAYED RELEASE ORAL
Qty: 90 TABLET | Refills: 1 | Status: SHIPPED | OUTPATIENT
Start: 2022-10-04

## 2022-10-04 RX ORDER — LISINOPRIL AND HYDROCHLOROTHIAZIDE 12.5; 2 MG/1; MG/1
1 TABLET ORAL DAILY
Qty: 90 TABLET | Refills: 1 | Status: SHIPPED | OUTPATIENT
Start: 2022-10-04

## 2022-10-04 RX ORDER — CYANOCOBALAMIN 1000 UG/ML
INJECTION, SOLUTION INTRAMUSCULAR; SUBCUTANEOUS
Qty: 12 ML | Refills: 3
Start: 2022-10-04

## 2022-10-04 RX ORDER — EZETIMIBE 10 MG/1
10 TABLET ORAL DAILY
Qty: 90 TABLET | Refills: 1 | Status: SHIPPED | OUTPATIENT
Start: 2022-10-04

## 2022-10-04 RX ORDER — ATORVASTATIN CALCIUM 20 MG/1
TABLET, FILM COATED ORAL
Qty: 90 TABLET | Refills: 1 | Status: SHIPPED | OUTPATIENT
Start: 2022-10-04

## 2022-10-04 NOTE — PROGRESS NOTES
Chief Complaint   Patient presents with    Thyroid Problem     6M follow up       SUBJECTIVE:    Hanna Obrien is a 47 y.o. female who is here today for a follow up appointment regarding her current medical conditions which include: Essential hypertension, mixed hyperlipidemia, acquired hypothyroidism, GERD, pernicious anemia, and long-term statin therapy use. She states she is feeling fairly well overall, denies any new or acute complaints at this time. The patient continues to take her medications for management of her hypertension and cholesterol. She is tolerating these well without adverse side effects. Her blood pressure appears well controlled as well. She denies any recent episodes of chest pain, chest pressure, shortness of breath, headaches, dizziness, blurred vision, palpitations, or syncope episodes. She continues to take Synthroid 200 mcg daily as prescribed for management of her hypothyroidism. She denies any adverse side effects of the medication. She denies any hot/cold intolerance. She is currently on pantoprazole 40 mg daily for management of her GERD which is working well for her. She admits she has not had a colonoscopy done, but intends to schedule this soon. Current Outpatient Medications   Medication Sig Dispense Refill    Syringe with Needle, Disp, 3 mL 22 x 1 1/2\" syrg 1 Units by Does Not Apply route every seven (7) days. Indications: Use with B12 weekly 12 Each 1    fexofenadine-pseudoephedrine (ALLEGRA-D)  mg Tb12 Take 1 Tablet by mouth every twelve (12) hours. mometasone (NASONEX) 50 mcg/actuation nasal spray 2 Sprays daily. diphenhydrAMINE (BENADRYL) 25 mg tablet Take 25 mg by mouth every six (6) hours as needed.       atorvastatin (LIPITOR) 20 mg tablet TAKE 1 TABLET DAILY 90 Tablet 1    cyanocobalamin (VITAMIN B12) 1,000 mcg/mL injection INJECT 1ML INTRAMUSCULARLY EVERY 7 DAYS FOR ANEMIA DUE TO VITAMIN B12 DEFICIENCY-PERNICIOUS ANEMIA, INADEQUATE VITAMIN B12. DISCARD 28 DAYS AFTER FIRST USE 12 mL 3    ezetimibe (ZETIA) 10 mg tablet Take 1 Tablet by mouth daily. 90 Tablet 1    levothyroxine (SYNTHROID) 200 mcg tablet Take 1 Tablet by mouth Daily (before breakfast). Indications: a condition with low thyroid hormone levels 90 Tablet 1    lisinopril-hydroCHLOROthiazide (PRINZIDE, ZESTORETIC) 20-12.5 mg per tablet Take 1 Tablet by mouth daily. Indications: high blood pressure 90 Tablet 1    pantoprazole (PROTONIX) 40 mg tablet TAKE 1 TABLET DAILY FOR    INDIGESTION 90 Tablet 1    azelastine-fluticasone (Dymista) 137-50 mcg/spray spry 1 Spray by Nasal route two (2) times a day. Indications: seasonal runny nose (Patient not taking: Reported on 10/4/2022) 69 g 3    loratadine (CLARITIN) 10 mg tablet Take 10 mg by mouth. (Patient not taking: No sig reported)      emollient base (CREAM BASE EX) by Apply Externally route. (Patient not taking: No sig reported)       History reviewed. No pertinent past medical history. History reviewed. No pertinent surgical history.   Allergies   Allergen Reactions    Latex Other (comments) and Hives    Amoxicillin-Pot Clavulanate Other (comments)    Beeswax Anaphylaxis    Bee Venom Protein (Honey Bee) Other (comments)    Erythromycin Other (comments)    Sulfur-8 Other (comments)       REVIEW OF SYSTEMS:                                        POSITIVE= bold text  Negative = regular text    General:                     fever, chills, sweats, generalized weakness, weight loss/gain,                                       loss of appetite   Eyes:                           blurred vision, eye pain, loss of vision, double vision  ENT:                            rhinorrhea, pharyngitis   Respiratory:               cough, sputum production, SOB, NAVAS, wheezing, pleuritic pain   Cardiology:                chest pain, palpitations, orthopnea, PND, edema, syncope   Gastrointestinal:       abdominal pain , N/V, diarrhea, dysphagia, constipation, bleeding   Genitourinary:           frequency, urgency, dysuria, hematuria, incontinence   Muskuloskeletal :      arthralgia, myalgia, back pain  Hematology:              easy bruising, nose or gum bleeding, lymphadenopathy   Dermatological:         rash, ulceration, pruritis, color change / jaundice  Endocrine:                 hot flashes or polydipsia   Neurological:             headache, dizziness, confusion, focal weakness, paresthesia,                                      Speech difficulties, memory loss, gait difficulty  Psychological:          Feelings of anxiety, depression, agitation        Social History     Socioeconomic History    Marital status:    Tobacco Use    Smoking status: Every Day     Packs/day: 0.50     Years: 40.00     Pack years: 20.00     Types: Cigarettes    Smokeless tobacco: Never   Vaping Use    Vaping Use: Never used   Substance and Sexual Activity    Alcohol use: Yes     Comment: socially    Drug use: Never     Social Determinants of Health     Financial Resource Strain: Low Risk     Difficulty of Paying Living Expenses: Not hard at all   Food Insecurity: No Food Insecurity    Worried About Running Out of Food in the Last Year: Never true    Ran Out of Food in the Last Year: Never true     History reviewed. No pertinent family history. OBJECTIVE:     Visit Vitals  /72 (BP 1 Location: Left arm, BP Patient Position: Sitting, BP Cuff Size: Child)   Pulse 83   Temp 97.7 °F (36.5 °C) (Oral)   Resp 16   Ht 5' 10\" (1.778 m)   Wt 199 lb (90.3 kg)   SpO2 97%   BMI 28.55 kg/m²       Constitutional: She appears well nourished, of stated age, and dressed appropriately. Eyes: Sclera anicteric, PERRLA, EOMI  Neck: Supple without lymphadenopathy. Thyroid normal, No JVD or bruits  Respiratory: Clear to ascultation X5, normal inspiratory effort, no adventitious breath sounds.   Cardiovascular: Regular rate and rhythm, no murmurs, rubs or gallops, PMI not displaced, No thrills, no peripheral edema  Neuro: Non-focal exam, A & O X 3.  Psychiatric: Appropriate affect and demeanor, pleasant and cooperative. Patient's thought content and thought processing appear to be within normal limits. ASSESSMENT/PLAN:     ICD-10-CM ICD-9-CM    1. Hyponatremia  F95.6 421.3 METABOLIC PANEL, COMPREHENSIVE      2. Gastroesophageal reflux disease without esophagitis  K21.9 530.81 pantoprazole (PROTONIX) 40 mg tablet      3. Mixed hyperlipidemia  E78.2 272.2 LIPID PANEL      atorvastatin (LIPITOR) 20 mg tablet      ezetimibe (ZETIA) 10 mg tablet      4. Acquired hypothyroidism  E03.9 244.9 TSH 3RD GENERATION      T4, FREE      levothyroxine (SYNTHROID) 200 mcg tablet      5. Pernicious anemia  D51.0 281.0 CBC W/O DIFF      VITAMIN B12      cyanocobalamin (VITAMIN B12) 1,000 mcg/mL injection      6. On statin therapy  Z79.899 V58.69 CK      7. B12 deficiency  E53.8 266.2 cyanocobalamin (VITAMIN B12) 1,000 mcg/mL injection      8. Essential hypertension  Q62 644.1 METABOLIC PANEL, COMPREHENSIVE      CBC W/O DIFF      lisinopril-hydroCHLOROthiazide (PRINZIDE, ZESTORETIC) 20-12.5 mg per tablet        1: We will repeat labs including: CBC, CMP, lipid panel, TSH, free T4, B12, and CK level. 2: Patient is to continue current medications for management of hypertension, hypercholesterolemia, hypothyroidism, and GERD. 3: Focus on healthy lifestyle management including: Low-fat/low-cholesterol diet, adequate amounts of fiber water, and regular exercise patterns. 4: Recommend discontinuation of smoking due to health risk. 5: Patient to follow-up with gastroenterology for screening colonoscopy as discussed. 6: Continue all other medications and supplements as prescribed. 7: Patient to follow-up with me in approximately 6 months, or sooner as needed. Patient states understanding and agrees with plan.       Orders Placed This Encounter    [facility]    METABOLIC PANEL, COMPREHENSIVE    LIPID PANEL    TSH 3RD GENERATION    T4, FREE    CBC W/O DIFF    VITAMIN B12    atorvastatin (LIPITOR) 20 mg tablet    cyanocobalamin (VITAMIN B12) 1,000 mcg/mL injection    ezetimibe (ZETIA) 10 mg tablet    levothyroxine (SYNTHROID) 200 mcg tablet    lisinopril-hydroCHLOROthiazide (PRINZIDE, ZESTORETIC) 20-12.5 mg per tablet    pantoprazole (PROTONIX) 40 mg tablet         ATTENTION:   This medical record was transcribed using an electronic medical records system. Although proofread, it may and can contain electronic and spelling errors. Other human spelling and other errors may be present. Corrections may be executed at a later time. Please feel free to contact us for any clarifications as needed. Follow-up and Dispositions    Return in about 6 months (around 4/4/2023) for Physical exam and fasting labs. Signed,  Richa Carvajal.  Lauren Lopez, MSN APRN FNP-BC

## 2022-10-04 NOTE — PROGRESS NOTES
Ruben Box is a 47 y.o. female     Chief Complaint   Patient presents with    Thyroid Problem     6M follow up       Visit Vitals  /72 (BP 1 Location: Left arm, BP Patient Position: Sitting, BP Cuff Size: Child)   Pulse 83   Temp 97.7 °F (36.5 °C) (Oral)   Resp 16   Ht 5' 10\" (1.778 m)   Wt 199 lb (90.3 kg)   SpO2 97%   BMI 28.55 kg/m²       Health Maintenance Due   Topic Date Due    Pneumococcal 0-64 years (1 - PCV) Never done    DTaP/Tdap/Td series (1 - Tdap) Never done    Cervical cancer screen  Never done    Colorectal Cancer Screening Combo  Never done    Shingrix Vaccine Age 50> (1 of 2) Never done    Low dose CT lung screening  Never done    COVID-19 Vaccine (3 - Booster for Moderna series) 03/08/2022    Breast Cancer Screen Mammogram  07/08/2022    Flu Vaccine (1) Never done         1. \"Have you been to the ER, urgent care clinic since your last visit? Hospitalized since your last visit? \" No    2. \"Have you seen or consulted any other health care providers outside of the [address] [place] [address] since your last visit? \" No     3. For patients aged 39-70: Has the patient had a colonoscopy / FIT/ Cologuard? No      If the patient is female:    4. For patients aged 41-77: Has the patient had a mammogram within the past 2 years? Yes - Care Gap present. Rooming MA/LPN to request most recent results      5. For patients aged 21-65: Has the patient had a pap smear? Yes - Care Gap present.  Rooming MA/LPN to request most recent results

## 2022-10-05 LAB
ALBUMIN SERPL-MCNC: 4 G/DL (ref 3.5–5)
ALBUMIN/GLOB SERPL: 1.2 {RATIO} (ref 1.1–2.2)
ALP SERPL-CCNC: 140 U/L (ref 45–117)
ALT SERPL-CCNC: 41 U/L (ref 12–78)
ANION GAP SERPL CALC-SCNC: 4 MMOL/L (ref 5–15)
AST SERPL-CCNC: 38 U/L (ref 15–37)
BILIRUB SERPL-MCNC: 0.5 MG/DL (ref 0.2–1)
BUN SERPL-MCNC: 6 MG/DL (ref 6–20)
BUN/CREAT SERPL: 8 (ref 12–20)
CALCIUM SERPL-MCNC: 9.6 MG/DL (ref 8.5–10.1)
CHLORIDE SERPL-SCNC: 101 MMOL/L (ref 97–108)
CHOLEST SERPL-MCNC: 225 MG/DL
CK SERPL-CCNC: 57 U/L (ref 26–192)
CO2 SERPL-SCNC: 28 MMOL/L (ref 21–32)
COMMENT, HOLDF: NORMAL
CREAT SERPL-MCNC: 0.74 MG/DL (ref 0.55–1.02)
ERYTHROCYTE [DISTWIDTH] IN BLOOD BY AUTOMATED COUNT: 13.3 % (ref 11.5–14.5)
GLOBULIN SER CALC-MCNC: 3.3 G/DL (ref 2–4)
GLUCOSE SERPL-MCNC: 88 MG/DL (ref 65–100)
HCT VFR BLD AUTO: 42.7 % (ref 35–47)
HDLC SERPL-MCNC: 52 MG/DL
HDLC SERPL: 4.3 {RATIO} (ref 0–5)
HGB BLD-MCNC: 14.1 G/DL (ref 11.5–16)
LDLC SERPL CALC-MCNC: 112.8 MG/DL (ref 0–100)
MCH RBC QN AUTO: 32.2 PG (ref 26–34)
MCHC RBC AUTO-ENTMCNC: 33 G/DL (ref 30–36.5)
MCV RBC AUTO: 97.5 FL (ref 80–99)
NRBC # BLD: 0 K/UL (ref 0–0.01)
NRBC BLD-RTO: 0 PER 100 WBC
PLATELET # BLD AUTO: 292 K/UL (ref 150–400)
PMV BLD AUTO: 10.7 FL (ref 8.9–12.9)
POTASSIUM SERPL-SCNC: 4.4 MMOL/L (ref 3.5–5.1)
PROT SERPL-MCNC: 7.3 G/DL (ref 6.4–8.2)
RBC # BLD AUTO: 4.38 M/UL (ref 3.8–5.2)
SAMPLES BEING HELD,HOLD: NORMAL
SODIUM SERPL-SCNC: 133 MMOL/L (ref 136–145)
T4 FREE SERPL-MCNC: 1.6 NG/DL (ref 0.8–1.5)
TRIGL SERPL-MCNC: 301 MG/DL (ref ?–150)
TSH SERPL DL<=0.05 MIU/L-ACNC: 0.08 UIU/ML (ref 0.36–3.74)
VIT B12 SERPL-MCNC: 1403 PG/ML (ref 193–986)
VLDLC SERPL CALC-MCNC: 60.2 MG/DL
WBC # BLD AUTO: 6 K/UL (ref 3.6–11)

## 2022-10-05 NOTE — PROGRESS NOTES
Vitamin B12 shows no signs of deficiency. Blood counts are normal with no signs of anemia. Thyroid shows to be a bit overly suppressed with current dose of levothyroxine. Please continue levothyroxine 200 mcg daily, but take 1/2 tablet each Friday. We will recheck this again at a later visit. Cholesterol levels remain a bit higher than normal.  Triglycerides have increased quite a bit. Please focus on low-fat/low-cholesterol diet and continue atorvastatin and ezetimibe. Metabolic panel shows no significant changes. CK is normal.    I would like to recheck your thyroid level in 3 months. Please make a lab only appointment for this.

## 2022-11-29 DIAGNOSIS — E78.2 MIXED HYPERLIPIDEMIA: ICD-10-CM

## 2022-11-29 RX ORDER — ATORVASTATIN CALCIUM 20 MG/1
TABLET, FILM COATED ORAL
Qty: 90 TABLET | Refills: 1 | Status: SHIPPED | OUTPATIENT
Start: 2022-11-29

## 2022-11-29 NOTE — TELEPHONE ENCOUNTER
PCP: Tawana Dailey NP    Last appt: 10/4/2022  Future Appointments   Date Time Provider Marshall Gil   1/9/2023  8:40 AM LAB ONLY [facility] AMB   4/4/2023  8:30 AM Tawana Dailey NP [facility] AMB       Requested Prescriptions     Pending Prescriptions Disp Refills    atorvastatin (LIPITOR) 20 mg tablet 90 Tablet 1     Sig: TAKE 1 TABLET DAILY       Prior labs and Blood pressures:  BP Readings from Last 3 Encounters:   10/04/22 126/72   04/01/22 120/70   12/30/21 138/84     Lab Results   Component Value Date/Time    Sodium 133 (L) 10/04/2022 09:06 AM    Potassium 4.4 10/04/2022 09:06 AM    Chloride 101 10/04/2022 09:06 AM    CO2 28 10/04/2022 09:06 AM    Anion gap 4 (L) 10/04/2022 09:06 AM    Glucose 88 10/04/2022 09:06 AM    BUN 6 10/04/2022 09:06 AM    Creatinine 0.74 10/04/2022 09:06 AM    BUN/Creatinine ratio 8 (L) 10/04/2022 09:06 AM    GFR est AA >60 04/01/2022 09:07 AM    GFR est non-AA >60 04/01/2022 09:07 AM    Calcium 9.6 10/04/2022 09:06 AM

## 2023-01-03 DIAGNOSIS — E53.8 B12 DEFICIENCY: ICD-10-CM

## 2023-01-03 DIAGNOSIS — D51.0 PERNICIOUS ANEMIA: ICD-10-CM

## 2023-01-03 RX ORDER — SYRINGE W-NEEDLE,DISPOSAB,3 ML 25GX5/8"
1 SYRINGE, EMPTY DISPOSABLE MISCELLANEOUS
Qty: 12 EACH | Refills: 1 | Status: SHIPPED | OUTPATIENT
Start: 2023-01-03

## 2023-01-03 NOTE — TELEPHONE ENCOUNTER
PCP: Cherylene Gip, NP    Last appt: 10/4/2022  Future Appointments   Date Time Provider Marshall Gil   2023  8:40 AM LAB ONLY [facility] AMB   2023  8:30 AM Cherylene Gip, NP [facility] AMB       Requested Prescriptions     Pending Prescriptions Disp Refills    Syringe with Needle, Disp, 3 mL 22 x 1 1/2\" syrg 12 Each 1     Si Units by Does Not Apply route every seven (7) days.  Indications: Use with B12 weekly       Prior labs and Blood pressures:  BP Readings from Last 3 Encounters:   10/04/22 126/72   22 120/70   21 138/84     Lab Results   Component Value Date/Time    Sodium 133 (L) 10/04/2022 09:06 AM    Potassium 4.4 10/04/2022 09:06 AM    Chloride 101 10/04/2022 09:06 AM    CO2 28 10/04/2022 09:06 AM    Anion gap 4 (L) 10/04/2022 09:06 AM    Glucose 88 10/04/2022 09:06 AM    BUN 6 10/04/2022 09:06 AM    Creatinine 0.74 10/04/2022 09:06 AM    BUN/Creatinine ratio 8 (L) 10/04/2022 09:06 AM    GFR est AA >60 2022 09:07 AM    GFR est non-AA >60 2022 09:07 AM    Calcium 9.6 10/04/2022 09:06 AM

## 2023-01-05 DIAGNOSIS — I10 ESSENTIAL HYPERTENSION: ICD-10-CM

## 2023-01-05 RX ORDER — LISINOPRIL AND HYDROCHLOROTHIAZIDE 12.5; 2 MG/1; MG/1
1 TABLET ORAL DAILY
Qty: 90 TABLET | Refills: 1 | Status: SHIPPED | OUTPATIENT
Start: 2023-01-05

## 2023-01-05 NOTE — TELEPHONE ENCOUNTER
PCP: Briana Reyez NP    Last appt: 10/4/2022  Future Appointments   Date Time Provider Marshall Gil   1/9/2023  8:40 AM LAB ONLY [facility] AMB   4/4/2023  8:30 AM Briana Reyez NP [facility] AMB       Requested Prescriptions     Pending Prescriptions Disp Refills    lisinopril-hydroCHLOROthiazide (PRINZIDE, ZESTORETIC) 20-12.5 mg per tablet 90 Tablet 1     Sig: Take 1 Tablet by mouth daily.  Indications: high blood pressure       Prior labs and Blood pressures:  BP Readings from Last 3 Encounters:   10/04/22 126/72   04/01/22 120/70   12/30/21 138/84     Lab Results   Component Value Date/Time    Sodium 133 (L) 10/04/2022 09:06 AM    Potassium 4.4 10/04/2022 09:06 AM    Chloride 101 10/04/2022 09:06 AM    CO2 28 10/04/2022 09:06 AM    Anion gap 4 (L) 10/04/2022 09:06 AM    Glucose 88 10/04/2022 09:06 AM    BUN 6 10/04/2022 09:06 AM    Creatinine 0.74 10/04/2022 09:06 AM    BUN/Creatinine ratio 8 (L) 10/04/2022 09:06 AM    GFR est AA >60 04/01/2022 09:07 AM    GFR est non-AA >60 04/01/2022 09:07 AM    Calcium 9.6 10/04/2022 09:06 AM

## 2023-01-09 ENCOUNTER — LAB ONLY (OUTPATIENT)
Dept: INTERNAL MEDICINE CLINIC | Age: 53
End: 2023-01-09

## 2023-01-09 DIAGNOSIS — E03.9 ACQUIRED HYPOTHYROIDISM: Primary | ICD-10-CM

## 2023-01-09 LAB
T4 FREE SERPL-MCNC: 1.7 NG/DL (ref 0.8–1.5)
TSH SERPL DL<=0.05 MIU/L-ACNC: 0.08 UIU/ML (ref 0.36–3.74)

## 2023-01-10 DIAGNOSIS — E03.9 ACQUIRED HYPOTHYROIDISM: Primary | ICD-10-CM

## 2023-01-10 RX ORDER — LEVOTHYROXINE SODIUM 175 UG/1
175 TABLET ORAL
Qty: 90 TABLET | Refills: 1 | Status: SHIPPED | OUTPATIENT
Start: 2023-01-10

## 2023-01-10 NOTE — PROGRESS NOTES
Thyroid remains a bit overly suppressed with current dose of levothyroxine. I am going to reduce your dose to 175 mcg daily. We will need to recheck your labs in 3 to 4 months.

## 2023-02-22 DIAGNOSIS — K21.9 GASTROESOPHAGEAL REFLUX DISEASE WITHOUT ESOPHAGITIS: ICD-10-CM

## 2023-02-22 NOTE — TELEPHONE ENCOUNTER
Last Refill: 10/4/22  Last Visit: 10/4/2022   Next Visit: 4/4/2023    Requested Prescriptions     Pending Prescriptions Disp Refills    pantoprazole (PROTONIX) 40 mg tablet 90 Tablet 1     Sig: TAKE 1 TABLET DAILY FOR    INDIGESTION

## 2023-02-23 RX ORDER — PANTOPRAZOLE SODIUM 40 MG/1
TABLET, DELAYED RELEASE ORAL
Qty: 90 TABLET | Refills: 1 | Status: SHIPPED | OUTPATIENT
Start: 2023-02-23

## 2023-04-04 ENCOUNTER — OFFICE VISIT (OUTPATIENT)
Dept: INTERNAL MEDICINE CLINIC | Age: 53
End: 2023-04-04
Payer: COMMERCIAL

## 2023-04-04 PROBLEM — Z87.892 HISTORY OF ANAPHYLAXIS: Status: ACTIVE | Noted: 2023-04-04

## 2023-04-04 LAB
ALBUMIN SERPL-MCNC: 3.9 G/DL (ref 3.5–5)
ALBUMIN/GLOB SERPL: 1.3 (ref 1.1–2.2)
ALP SERPL-CCNC: 142 U/L (ref 45–117)
ALT SERPL-CCNC: 27 U/L (ref 12–78)
ANION GAP SERPL CALC-SCNC: 4 MMOL/L (ref 5–15)
APPEARANCE UR: CLEAR
AST SERPL-CCNC: 20 U/L (ref 15–37)
BACTERIA URNS QL MICRO: ABNORMAL /HPF
BILIRUB SERPL-MCNC: 0.4 MG/DL (ref 0.2–1)
BILIRUB UR QL: NEGATIVE
BUN SERPL-MCNC: 5 MG/DL (ref 6–20)
BUN/CREAT SERPL: 7 (ref 12–20)
CALCIUM SERPL-MCNC: 9.4 MG/DL (ref 8.5–10.1)
CHLORIDE SERPL-SCNC: 99 MMOL/L (ref 97–108)
CHOLEST SERPL-MCNC: 215 MG/DL
CO2 SERPL-SCNC: 29 MMOL/L (ref 21–32)
COLOR UR: ABNORMAL
CREAT SERPL-MCNC: 0.73 MG/DL (ref 0.55–1.02)
EPITH CASTS URNS QL MICRO: ABNORMAL /LPF
ERYTHROCYTE [DISTWIDTH] IN BLOOD BY AUTOMATED COUNT: 13.2 % (ref 11.5–14.5)
GLOBULIN SER CALC-MCNC: 3.1 G/DL (ref 2–4)
GLUCOSE SERPL-MCNC: 84 MG/DL (ref 65–100)
GLUCOSE UR STRIP.AUTO-MCNC: NEGATIVE MG/DL
HCT VFR BLD AUTO: 43.1 % (ref 35–47)
HDLC SERPL-MCNC: 59 MG/DL
HDLC SERPL: 3.6 (ref 0–5)
HGB BLD-MCNC: 13.7 G/DL (ref 11.5–16)
HGB UR QL STRIP: NEGATIVE
HYALINE CASTS URNS QL MICRO: ABNORMAL /LPF (ref 0–5)
KETONES UR QL STRIP.AUTO: NEGATIVE MG/DL
LDLC SERPL CALC-MCNC: 117.2 MG/DL (ref 0–100)
LEUKOCYTE ESTERASE UR QL STRIP.AUTO: ABNORMAL
MCH RBC QN AUTO: 31.6 PG (ref 26–34)
MCHC RBC AUTO-ENTMCNC: 31.8 G/DL (ref 30–36.5)
MCV RBC AUTO: 99.3 FL (ref 80–99)
NITRITE UR QL STRIP.AUTO: NEGATIVE
NRBC # BLD: 0 K/UL (ref 0–0.01)
NRBC BLD-RTO: 0 PER 100 WBC
PH UR STRIP: 5.5 (ref 5–8)
PLATELET # BLD AUTO: 278 K/UL (ref 150–400)
PMV BLD AUTO: 10.8 FL (ref 8.9–12.9)
POTASSIUM SERPL-SCNC: 3.8 MMOL/L (ref 3.5–5.1)
PROT SERPL-MCNC: 7 G/DL (ref 6.4–8.2)
PROT UR STRIP-MCNC: NEGATIVE MG/DL
RBC # BLD AUTO: 4.34 M/UL (ref 3.8–5.2)
RBC #/AREA URNS HPF: ABNORMAL /HPF (ref 0–5)
SODIUM SERPL-SCNC: 132 MMOL/L (ref 136–145)
SP GR UR REFRACTOMETRY: 1.01 (ref 1–1.03)
T4 FREE SERPL-MCNC: 1.6 NG/DL (ref 0.8–1.5)
TRIGL SERPL-MCNC: 194 MG/DL (ref ?–150)
TSH SERPL DL<=0.05 MIU/L-ACNC: 0.23 UIU/ML (ref 0.36–3.74)
UROBILINOGEN UR QL STRIP.AUTO: 0.2 EU/DL (ref 0.2–1)
VLDLC SERPL CALC-MCNC: 38.8 MG/DL
WBC # BLD AUTO: 5.6 K/UL (ref 3.6–11)
WBC URNS QL MICRO: ABNORMAL /HPF (ref 0–4)

## 2023-04-04 PROCEDURE — 3074F SYST BP LT 130 MM HG: CPT | Performed by: NURSE PRACTITIONER

## 2023-04-04 PROCEDURE — 99214 OFFICE O/P EST MOD 30 MIN: CPT | Performed by: NURSE PRACTITIONER

## 2023-04-04 PROCEDURE — 3078F DIAST BP <80 MM HG: CPT | Performed by: NURSE PRACTITIONER

## 2023-04-04 RX ORDER — SYRINGE W-NEEDLE,DISPOSAB,3 ML 25GX5/8"
1 SYRINGE, EMPTY DISPOSABLE MISCELLANEOUS
Qty: 12 EACH | Refills: 1 | Status: SHIPPED
Start: 2023-04-04

## 2023-04-04 RX ORDER — FLUTICASONE PROPIONATE 50 MCG: 1-2 SPRAY, SUSPENSION (ML) NASAL

## 2023-04-04 RX ORDER — EPINEPHRINE 0.3 MG/.3ML: 0.3 INJECTION SUBCUTANEOUS

## 2023-04-04 RX ORDER — MINERAL OIL: 1 ENEMA (ML) RECTAL

## 2023-04-04 NOTE — PROGRESS NOTES
Emilia Gilbert is a 47 y.o. female     Chief Complaint   Patient presents with    Complete Physical       Visit Vitals  /70 (BP 1 Location: Left upper arm, BP Patient Position: Sitting, BP Cuff Size: Adult)   Pulse 75   Temp 98 °F (36.7 °C) (Oral)   Resp 16   Ht 5' 10\" (1.778 m)   Wt 200 lb 9.6 oz (91 kg)   SpO2 98%   BMI 28.78 kg/m²       Health Maintenance Due   Topic Date Due    Pneumococcal 0-64 years (1 - PCV) Never done    DTaP/Tdap/Td series (1 - Tdap) Never done    Colorectal Cancer Screening Combo  Never done    Shingles Vaccine (1 of 2) Never done    Low dose CT lung screening  Never done    COVID-19 Vaccine (3 - Booster for Moderna series) 12/03/2021         1. \"Have you been to the ER, urgent care clinic since your last visit? Hospitalized since your last visit? \" No    2. \"Have you seen or consulted any other health care providers outside of the [address] [place] [address] since your last visit? \" No     3. For patients aged 39-70: Has the patient had a colonoscopy / FIT/ Cologuard? No      If the patient is female:    4. For patients aged 41-77: Has the patient had a mammogram within the past 2 years? Yes - no Care Gap present      5. For patients aged 21-65: Has the patient had a pap smear?  Yes - no Care Gap present

## 2023-04-04 NOTE — PROGRESS NOTES
Chief Complaint   Patient presents with    Complete Physical       SUBJECTIVE:    Jose Lipscomb is a 47 y.o. female who is here today for a routine physical examination as well as follow up appointment regarding current medical conditions including: Essential hypertension, mixed hyperlipidemia, acquired hypothyroidism, environmental and seasonal allergies, GERD, pernicious anemia, B12 deficiency, history of anaphylaxis, and long-term use of statin therapy. She is fasting today. Patient continues to take lisinopril/HCTZ daily for management of her hypertension. Her blood pressure appears stable and well controlled at this time. She is also on ezetimibe and atorvastatin daily for management of her cholesterol. She denies any adverse side effects of the medications. She denies any recent episodes of chest pain, chest pressure, shortness of breath, headaches, dizziness, blurred vision, palpitations, or syncope episodes. She continues to take levothyroxine daily for management of her hypothyroidism. On previous labs, the patient's TSH was quite suppressed and she was reduced from 200 mcg daily to 175 mcg daily. However, she does admit that she feels \"a bit more tired\" on this current dose. We will adjust if necessary. She continues to use fexofenadine and cortisone nasal spray for management of her allergies. She states this works well. She does admit to an occasional nosebleed, but is not uncontrollable. She continues to take pantoprazole daily for management of her GERD. She states her symptoms are well controlled with the medication. She has not had a colonoscopy, but is willing to schedule this today. She continues to use B12 injections regularly. She is requesting a refill for her syringes today. She has a history of anaphylaxis and currently has an EpiPen available. She has not had to use this in quite some time.     Current Outpatient Medications   Medication Sig Dispense Refill EPINEPHrine (EPIPEN) 0.3 mg/0.3 mL injection 0.3 mL by IntraMUSCular route. fexofenadine (ALLEGRA) 180 mg tablet Take 1 Tablet by mouth. fluticasone propionate (FLONASE) 50 mcg/actuation nasal spray 1-2 Sprays by Nasal route. Syringe with Needle, Disp, 3 mL 22 x 1 1/2\" syrg 1 Units by Does Not Apply route every seven (7) days. Indications: Use with B12 weekly 12 Each 1    pantoprazole (PROTONIX) 40 mg tablet TAKE 1 TABLET DAILY FOR    INDIGESTION 90 Tablet 1    levothyroxine (SYNTHROID) 175 mcg tablet Take 1 Tablet by mouth Daily (before breakfast). Indications: a condition with low thyroid hormone levels 90 Tablet 1    lisinopril-hydroCHLOROthiazide (PRINZIDE, ZESTORETIC) 20-12.5 mg per tablet Take 1 Tablet by mouth daily. Indications: high blood pressure 90 Tablet 1    atorvastatin (LIPITOR) 20 mg tablet TAKE 1 TABLET DAILY 90 Tablet 1    cyanocobalamin (VITAMIN B12) 1,000 mcg/mL injection INJECT 1ML INTRAMUSCULARLY EVERY 7 DAYS FOR ANEMIA DUE TO VITAMIN B12 DEFICIENCY-PERNICIOUS ANEMIA, INADEQUATE VITAMIN B12. DISCARD 28 DAYS AFTER FIRST USE 12 mL 3    ezetimibe (ZETIA) 10 mg tablet Take 1 Tablet by mouth daily. 90 Tablet 1    diphenhydrAMINE (BENADRYL) 25 mg tablet Take 25 mg by mouth every six (6) hours as needed. fexofenadine-pseudoephedrine (ALLEGRA-D)  mg Tb12 Take 1 Tablet by mouth every twelve (12) hours. (Patient not taking: Reported on 4/4/2023)      mometasone (NASONEX) 50 mcg/actuation nasal spray 2 Sprays daily. (Patient not taking: Reported on 4/4/2023)      loratadine (CLARITIN) 10 mg tablet Take 10 mg by mouth. (Patient not taking: No sig reported)       History reviewed. No pertinent past medical history. History reviewed. No pertinent surgical history.   Allergies   Allergen Reactions    Latex Other (comments), Hives and Unknown (comments)    Amoxicillin-Pot Clavulanate Other (comments)    Beeswax Anaphylaxis    Bee Venom Protein (Honey Bee) Other (comments) Erythromycin Other (comments)    Sulfa (Sulfonamide Antibiotics) Other (comments)    Sulfur-8 Other (comments)       REVIEW OF SYSTEMS:                                        POSITIVE= bold text  Negative = regular text    General:                     fever, chills, sweats, generalized weakness, weight loss/gain,                                       loss of appetite   Eyes:                           blurred vision, eye pain, loss of vision, double vision  ENT:                            rhinorrhea, pharyngitis   Respiratory:               cough, sputum production, SOB, NAVAS, wheezing, pleuritic pain   Cardiology:                chest pain, palpitations, orthopnea, PND, edema, syncope   Gastrointestinal:       abdominal pain , N/V, diarrhea, dysphagia, constipation, bleeding   Genitourinary:           frequency, urgency, dysuria, hematuria, incontinence   Muskuloskeletal :      arthralgia, myalgia, back pain  Hematology:              easy bruising, nose or gum bleeding, lymphadenopathy   Dermatological:         rash, ulceration, pruritis, color change / jaundice  Endocrine:                 hot flashes or polydipsia   Neurological:             headache, dizziness, confusion, focal weakness, paresthesia,                                      Speech difficulties, memory loss, gait difficulty  Psychological:          Feelings of anxiety, depression, agitation        Social History     Socioeconomic History    Marital status:    Tobacco Use    Smoking status: Every Day     Packs/day: 0.50     Years: 40.00     Pack years: 20.00     Types: Cigarettes    Smokeless tobacco: Never   Vaping Use    Vaping Use: Never used   Substance and Sexual Activity    Alcohol use: Yes     Comment: socially    Drug use: Never     Social Determinants of Health     Financial Resource Strain: Low Risk     Difficulty of Paying Living Expenses: Not hard at all   Food Insecurity: No Food Insecurity    Worried About Running Out of Food in the Last Year: Never true    Ran Out of Food in the Last Year: Never true     History reviewed. No pertinent family history. OBJECTIVE:     Visit Vitals  /70 (BP 1 Location: Left upper arm, BP Patient Position: Sitting, BP Cuff Size: Adult)   Pulse 75   Temp 98 °F (36.7 °C) (Oral)   Resp 16   Ht 5' 10\" (1.778 m)   Wt 200 lb 9.6 oz (91 kg)   SpO2 98%   BMI 28.78 kg/m²       Constitutional: She appears well nourished, of stated age, and dressed appropriately. Eyes: Sclera anicteric, PERRLA, EOMI  ENT: Nares clear, moist mucous membranes, pharynx clear  Neck: Supple without lymphadenopathy. Thyroid normal, No JVD or bruits  Respiratory: Clear to ascultation X5, normal inspiratory effort, no adventitious breath sounds. Cardiovascular: Regular rate and rhythm, no rubs or gallops, PMI not displaced, No thrills, no peripheral edema  Gastrointestinal: Abdomen non-distended, soft, non-tender, bowel sounds normal and active X4. Hematologic: No purpura, petechiae or unexplained bruising  Lymphatic: No lymph node enlargemant. Musculoskeletal: Mild paraspinal tenderness to lower lumbar on palpation. Integumentary: No unusual rashes or suspicious skin lesions noted. Neuro: Non-focal exam, A & O X 3, DTRs equal and adequate. Psychiatric: Appropriate affect and demeanor, pleasant and cooperative. Patient's thought content and thought processing appear to be within normal limits. ASSESSMENT/PLAN:     ICD-10-CM ICD-9-CM    1. Routine physical examination  Z00.00 V70.0 URINALYSIS W/ RFLX MICROSCOPIC      2. Essential hypertension  I10 401.9 CBC W/O DIFF      METABOLIC PANEL, COMPREHENSIVE      METABOLIC PANEL, COMPREHENSIVE      CBC W/O DIFF      3. Mixed hyperlipidemia  E78.2 272.2 LIPID PANEL      LIPID PANEL      4. Acquired hypothyroidism  E03.9 244.9 TSH 3RD GENERATION      T4, FREE      T4, FREE      TSH 3RD GENERATION      5. Gastroesophageal reflux disease without esophagitis  K21.9 530.81       6. Pernicious anemia  D51.0 281.0 Syringe with Needle, Disp, 3 mL 22 x 1 1/2\" syrg      7. B12 deficiency  E53.8 266.2 Syringe with Needle, Disp, 3 mL 22 x 1 1/2\" syrg      8. On statin therapy  Z79.899 V58.69       9. Screen for colon cancer  Z12.11 V76.51 REFERRAL FOR COLONOSCOPY      10. History of anaphylaxis  Z87.892 V13.81 EPINEPHrine (EPIPEN) 0.3 mg/0.3 mL injection      11. Environmental and seasonal allergies  J30.89 477.8 fexofenadine (ALLEGRA) 180 mg tablet      fluticasone propionate (FLONASE) 50 mcg/actuation nasal spray        1: Labs ordered today include: CBC, CMP, lipid panel, TSH, free T4, and urinalysis. 2: Patient to continue lisinopril/HCTZ daily for management of hypertension. Blood pressure appears stable and well controlled. 3: Continue atorvastatin and ezetimibe daily for management of mixed hyperlipidemia. Patient tolerating well. 4: Continue levothyroxine 175 mcg daily for management of hypothyroidism. I will adjust this according to results if necessary. 5: Continue pantoprazole 40 mg daily for management of GERD. Symptoms stable. 6: Patient to continue B12 injections as directed. Syringes refill sent to pharmacy. 7: Patient will be referred to gastroenterology for screening colonoscopy as discussed. 8: Patient to use EpiPen as needed for any symptoms of anaphylaxis. New prescription sent to pharmacy. 9: Patient to continue fexofenadine and fluticasone nasal spray for management of seasonal allergies. May use saline nasal wash to promote adequate hydration of turbinates to reduce risk of bleeding. 10: Continue healthy lifestyle management with appropriate dietary intake and regular exercise patterns as tolerated. 11: Patient to follow-up with me in approximately 6 months, or sooner as needed. Patient states understanding and agrees with plan.     Orders Placed This Encounter    CBC W/O DIFF    METABOLIC PANEL, COMPREHENSIVE    LIPID PANEL    TSH 3RD GENERATION    T4, FREE URINALYSIS W/ RFLX MICROSCOPIC    Referral for Colonoscopy (options for GI, Colon &  Rectal Surgery, & General Surgery)    EPINEPHrine (EPIPEN) 0.3 mg/0.3 mL injection    fexofenadine (ALLEGRA) 180 mg tablet    fluticasone propionate (FLONASE) 50 mcg/actuation nasal spray    Syringe with Needle, Disp, 3 mL 22 x 1 1/2\" syrg         ATTENTION:   This medical record was transcribed using an electronic medical records system. Although proofread, it may and can contain electronic and spelling errors. Other human spelling and other errors may be present. Corrections may be executed at a later time. Please feel free to contact us for any clarifications as needed. Follow-up and Dispositions    Return in about 6 months (around 10/4/2023) for Follow up with fasting labs. Signed,  Nate Lopes.  Shawn Booth, MSN APRN FNP-BC

## 2023-04-05 NOTE — PROGRESS NOTES
Urinalysis is unremarkable. Despite change in medication, thyroid shows to be somewhat overmedicated. However, without significant symptoms, I am going to leave this at current dose of 175 mcg/day. Lipid panel shows no significant changes and is still slightly elevated. Please continue ezetimibe and atorvastatin as prescribed. Focus on low-fat/low-cholesterol diet. Metabolic panel is unremarkable. Blood counts are normal with no signs of anemia.

## 2023-05-24 RX ORDER — FEXOFENADINE HCL AND PSEUDOEPHEDRINE HCI 60; 120 MG/1; MG/1
1 TABLET, EXTENDED RELEASE ORAL EVERY 12 HOURS
COMMUNITY

## 2023-05-24 RX ORDER — LISINOPRIL AND HYDROCHLOROTHIAZIDE 20; 12.5 MG/1; MG/1
1 TABLET ORAL DAILY
COMMUNITY
Start: 2023-01-05 | End: 2023-06-06 | Stop reason: SDUPTHER

## 2023-05-24 RX ORDER — FLUTICASONE PROPIONATE 50 MCG
1-2 SPRAY, SUSPENSION (ML) NASAL
COMMUNITY

## 2023-05-24 RX ORDER — CYANOCOBALAMIN 1000 UG/ML
INJECTION, SOLUTION INTRAMUSCULAR; SUBCUTANEOUS
COMMUNITY
Start: 2022-10-04 | End: 2023-06-06 | Stop reason: SDUPTHER

## 2023-05-24 RX ORDER — LORATADINE 10 MG/1
10 TABLET ORAL
COMMUNITY

## 2023-05-24 RX ORDER — LEVOTHYROXINE SODIUM 175 UG/1
175 TABLET ORAL
COMMUNITY
Start: 2023-01-10 | End: 2023-05-30

## 2023-05-24 RX ORDER — MOMETASONE FUROATE 50 UG/1
2 SPRAY, METERED NASAL DAILY
COMMUNITY

## 2023-05-24 RX ORDER — EPINEPHRINE 0.3 MG/.3ML
0.3 INJECTION SUBCUTANEOUS
COMMUNITY

## 2023-05-24 RX ORDER — PANTOPRAZOLE SODIUM 40 MG/1
TABLET, DELAYED RELEASE ORAL
COMMUNITY
Start: 2023-02-23

## 2023-05-24 RX ORDER — ATORVASTATIN CALCIUM 20 MG/1
1 TABLET, FILM COATED ORAL DAILY
COMMUNITY
Start: 2022-11-29 | End: 2023-05-30

## 2023-05-24 RX ORDER — FEXOFENADINE HCL 180 MG/1
1 TABLET ORAL
COMMUNITY

## 2023-05-24 RX ORDER — EZETIMIBE 10 MG/1
10 TABLET ORAL DAILY
COMMUNITY
Start: 2022-10-04 | End: 2023-05-30

## 2023-05-30 RX ORDER — ATORVASTATIN CALCIUM 20 MG/1
TABLET, FILM COATED ORAL
Qty: 90 TABLET | Refills: 1 | Status: SHIPPED | OUTPATIENT
Start: 2023-05-30

## 2023-05-30 RX ORDER — EZETIMIBE 10 MG/1
TABLET ORAL
Qty: 90 TABLET | Refills: 1 | Status: SHIPPED | OUTPATIENT
Start: 2023-05-30

## 2023-05-30 RX ORDER — LEVOTHYROXINE SODIUM 175 MCG
TABLET ORAL
Qty: 90 TABLET | Refills: 1 | Status: SHIPPED | OUTPATIENT
Start: 2023-05-30

## 2023-05-30 NOTE — TELEPHONE ENCOUNTER
PCP: TJ Rose NP    Last appt: 4/4/2023  Future Appointments   Date Time Provider Cornell Toddi   10/5/2023  9:00 AM TJ Rose NP [facility] AMB       Requested Prescriptions     Pending Prescriptions Disp Refills    atorvastatin (LIPITOR) 20 MG tablet [Pharmacy Med Name: ATORVASTATIN TAB 20MG] 90 tablet 1     Sig: TAKE 1 TABLET DAILY    ezetimibe (ZETIA) 10 MG tablet [Pharmacy Med Name: EZETIMIBE TAB 10MG] 90 tablet 1     Sig: TAKE 1 TABLET DAILY    SYNTHROID 175 MCG tablet [Pharmacy Med Name: SYNTHROID TAB 175MCG] 90 tablet 1     Sig: TAKE 1 TABLET DAILY BEFORE BREAKFAST FOR A CONDITION  WITH LOW THYROID HORMONE   LEVELS       Prior labs and Blood pressures:  BP Readings from Last 3 Encounters:   04/04/23 118/70   10/04/22 126/72   04/01/22 120/70     Lab Results   Component Value Date/Time     04/04/2023 09:19 AM    K 3.8 04/04/2023 09:19 AM    CL 99 04/04/2023 09:19 AM    CO2 29 04/04/2023 09:19 AM    BUN 5 04/04/2023 09:19 AM    GFRAA >60 04/01/2022 09:07 AM     No results found for: HBA1C, [id number]  Lab Results   Component Value Date/Time    CHOL 215 04/04/2023 09:19 AM    HDL 59 04/04/2023 09:19 AM     No results found for: VITD3, VD3RIA        Lab Results   Component Value Date/Time    TSH 0.23 04/04/2023 09:19 AM

## 2023-06-06 RX ORDER — LISINOPRIL AND HYDROCHLOROTHIAZIDE 20; 12.5 MG/1; MG/1
1 TABLET ORAL DAILY
Qty: 90 TABLET | Refills: 1 | Status: SHIPPED | OUTPATIENT
Start: 2023-06-06

## 2023-06-06 RX ORDER — ATORVASTATIN CALCIUM 20 MG/1
20 TABLET, FILM COATED ORAL DAILY
Qty: 90 TABLET | Refills: 1 | Status: SHIPPED | OUTPATIENT
Start: 2023-06-06

## 2023-06-06 RX ORDER — CYANOCOBALAMIN 1000 UG/ML
INJECTION, SOLUTION INTRAMUSCULAR; SUBCUTANEOUS
Qty: 3 EACH | Refills: 1 | Status: SHIPPED | OUTPATIENT
Start: 2023-06-06

## 2023-06-06 RX ORDER — EZETIMIBE 10 MG/1
10 TABLET ORAL DAILY
Qty: 90 TABLET | Refills: 1 | Status: SHIPPED | OUTPATIENT
Start: 2023-06-06

## 2023-06-06 RX ORDER — LEVOTHYROXINE SODIUM 175 UG/1
TABLET ORAL
Qty: 90 TABLET | Refills: 1 | Status: SHIPPED | OUTPATIENT
Start: 2023-06-06

## 2023-10-05 ENCOUNTER — OFFICE VISIT (OUTPATIENT)
Facility: CLINIC | Age: 53
End: 2023-10-05
Payer: COMMERCIAL

## 2023-10-05 VITALS
WEIGHT: 195.2 LBS | RESPIRATION RATE: 16 BRPM | SYSTOLIC BLOOD PRESSURE: 122 MMHG | OXYGEN SATURATION: 97 % | TEMPERATURE: 98 F | HEIGHT: 70 IN | BODY MASS INDEX: 27.94 KG/M2 | DIASTOLIC BLOOD PRESSURE: 76 MMHG | HEART RATE: 77 BPM

## 2023-10-05 DIAGNOSIS — D51.0 VITAMIN B12 DEFICIENCY ANEMIA DUE TO INTRINSIC FACTOR DEFICIENCY: ICD-10-CM

## 2023-10-05 DIAGNOSIS — I10 ESSENTIAL HYPERTENSION: Primary | ICD-10-CM

## 2023-10-05 DIAGNOSIS — L30.9 ECZEMA OF LOWER LEG: ICD-10-CM

## 2023-10-05 DIAGNOSIS — E03.9 ACQUIRED HYPOTHYROIDISM: ICD-10-CM

## 2023-10-05 DIAGNOSIS — K21.9 GASTRO-ESOPHAGEAL REFLUX DISEASE WITHOUT ESOPHAGITIS: ICD-10-CM

## 2023-10-05 DIAGNOSIS — E78.2 MIXED HYPERLIPIDEMIA: ICD-10-CM

## 2023-10-05 PROCEDURE — 3078F DIAST BP <80 MM HG: CPT | Performed by: NURSE PRACTITIONER

## 2023-10-05 PROCEDURE — 3074F SYST BP LT 130 MM HG: CPT | Performed by: NURSE PRACTITIONER

## 2023-10-05 PROCEDURE — 99214 OFFICE O/P EST MOD 30 MIN: CPT | Performed by: NURSE PRACTITIONER

## 2023-10-05 RX ORDER — SYRINGE W-NEEDLE,DISPOSAB,3 ML 25GX5/8"
1 SYRINGE, EMPTY DISPOSABLE MISCELLANEOUS
Qty: 30 EACH | Refills: 1 | Status: SHIPPED | OUTPATIENT
Start: 2023-10-05

## 2023-10-05 RX ORDER — SYRINGE W-NEEDLE,DISPOSAB,3 ML 25GX5/8"
1 SYRINGE, EMPTY DISPOSABLE MISCELLANEOUS
COMMUNITY
Start: 2023-04-04 | End: 2023-10-05 | Stop reason: SDUPTHER

## 2023-10-05 RX ORDER — ATORVASTATIN CALCIUM 20 MG/1
20 TABLET, FILM COATED ORAL DAILY
Qty: 90 TABLET | Refills: 1 | Status: SHIPPED | OUTPATIENT
Start: 2023-10-05

## 2023-10-05 RX ORDER — LEVOTHYROXINE SODIUM 175 UG/1
TABLET ORAL
Qty: 90 TABLET | Refills: 1 | Status: SHIPPED | OUTPATIENT
Start: 2023-10-05

## 2023-10-05 RX ORDER — LISINOPRIL AND HYDROCHLOROTHIAZIDE 20; 12.5 MG/1; MG/1
1 TABLET ORAL DAILY
Qty: 90 TABLET | Refills: 1 | Status: SHIPPED | OUTPATIENT
Start: 2023-10-05

## 2023-10-05 RX ORDER — CYANOCOBALAMIN 1000 UG/ML
INJECTION, SOLUTION INTRAMUSCULAR; SUBCUTANEOUS
Qty: 3 EACH | Refills: 1 | Status: SHIPPED | OUTPATIENT
Start: 2023-10-05

## 2023-10-05 RX ORDER — EZETIMIBE 10 MG/1
10 TABLET ORAL DAILY
Qty: 90 TABLET | Refills: 1 | Status: SHIPPED | OUTPATIENT
Start: 2023-10-05

## 2023-10-05 RX ORDER — CLOBETASOL PROPIONATE 0.5 MG/G
OINTMENT TOPICAL
Qty: 15 G | Refills: 2 | Status: SHIPPED | OUTPATIENT
Start: 2023-10-05

## 2023-10-05 RX ORDER — PANTOPRAZOLE SODIUM 40 MG/1
TABLET, DELAYED RELEASE ORAL
Qty: 90 TABLET | Refills: 1 | Status: SHIPPED | OUTPATIENT
Start: 2023-10-05

## 2023-10-05 SDOH — ECONOMIC STABILITY: INCOME INSECURITY: HOW HARD IS IT FOR YOU TO PAY FOR THE VERY BASICS LIKE FOOD, HOUSING, MEDICAL CARE, AND HEATING?: NOT HARD AT ALL

## 2023-10-05 SDOH — ECONOMIC STABILITY: HOUSING INSECURITY
IN THE LAST 12 MONTHS, WAS THERE A TIME WHEN YOU DID NOT HAVE A STEADY PLACE TO SLEEP OR SLEPT IN A SHELTER (INCLUDING NOW)?: NO

## 2023-10-05 SDOH — ECONOMIC STABILITY: FOOD INSECURITY: WITHIN THE PAST 12 MONTHS, THE FOOD YOU BOUGHT JUST DIDN'T LAST AND YOU DIDN'T HAVE MONEY TO GET MORE.: NEVER TRUE

## 2023-10-05 SDOH — ECONOMIC STABILITY: FOOD INSECURITY: WITHIN THE PAST 12 MONTHS, YOU WORRIED THAT YOUR FOOD WOULD RUN OUT BEFORE YOU GOT MONEY TO BUY MORE.: NEVER TRUE

## 2023-10-05 ASSESSMENT — PATIENT HEALTH QUESTIONNAIRE - PHQ9
SUM OF ALL RESPONSES TO PHQ QUESTIONS 1-9: 0
1. LITTLE INTEREST OR PLEASURE IN DOING THINGS: 0
SUM OF ALL RESPONSES TO PHQ QUESTIONS 1-9: 0
SUM OF ALL RESPONSES TO PHQ9 QUESTIONS 1 & 2: 0
SUM OF ALL RESPONSES TO PHQ QUESTIONS 1-9: 0
SUM OF ALL RESPONSES TO PHQ QUESTIONS 1-9: 0
2. FEELING DOWN, DEPRESSED OR HOPELESS: 0

## 2023-10-05 NOTE — PROGRESS NOTES
Chief Complaint   Patient presents with    Thyroid Problem     5M       SUBJECTIVE:    Abdiel Montgomery is a 46 y.o. female who is here today for a follow up appointment regarding current medical conditions including: Essential hypertension, mixed hyperlipidemia, acquired hypothyroidism, GERD, and vitamin B12 deficiency. She also complains of recurrent eczema rash to her right lower leg. She had previously been using steroids for this to keep under control. She has tried some over-the-counter products which have not been beneficial.    The patient is currently on lisinopril/HCTZ 20/12.5 milligrams daily for management of her hypertension. She has been tolerating this well without issue. Her blood pressures have remained stable with current use. She is also on atorvastatin as well as ezetimibe for management of her mixed hyperlipidemia and tolerating this well. She denies any recent episodes of chest pain, chest pressure, shortness of breath, headaches, dizziness, blurred vision, palpitations, or syncope episodes. She continues to use levothyroxine 175 mcg daily due to her hypothyroidism. She has been tolerating this well. She continues pantoprazole daily for management of her GERD and states this helps to control her symptoms. She denies any melena or hematochezia. She continues to self inject cyanocobalamin 1000 mcg monthly for management of her B12 deficiency. She has been tolerating this well.         Current Outpatient Medications   Medication Sig Dispense Refill    atorvastatin (LIPITOR) 20 MG tablet Take 1 tablet by mouth daily 90 tablet 1    cyanocobalamin 1000 MCG/ML injection INJECT 1ML INTRAMUSCULARLY EVERY 7 DAYS FOR ANEMIA DUE TO VITAMIN B12 DEFICIENCY-PERNICIOUS ANEMIA, INADEQUATE VITAMIN B12. DISCARD 28 DAYS AFTER FIRST USE 3 each 1    ezetimibe (ZETIA) 10 MG tablet Take 1 tablet by mouth daily 90 tablet 1    levothyroxine (SYNTHROID) 175 MCG tablet TAKE 1 TABLET DAILY BEFORE

## 2023-10-06 LAB
ALBUMIN SERPL-MCNC: 4.7 G/DL (ref 3.8–4.9)
ALBUMIN/GLOB SERPL: 2 {RATIO} (ref 1.2–2.2)
ALP SERPL-CCNC: 125 IU/L (ref 44–121)
ALT SERPL-CCNC: 27 IU/L (ref 0–32)
AST SERPL-CCNC: 33 IU/L (ref 0–40)
BILIRUB SERPL-MCNC: 0.3 MG/DL (ref 0–1.2)
BUN SERPL-MCNC: 5 MG/DL (ref 6–24)
BUN/CREAT SERPL: 7 (ref 9–23)
CALCIUM SERPL-MCNC: 9.4 MG/DL (ref 8.7–10.2)
CHLORIDE SERPL-SCNC: 97 MMOL/L (ref 96–106)
CHOLEST SERPL-MCNC: 208 MG/DL (ref 100–199)
CO2 SERPL-SCNC: 23 MMOL/L (ref 20–29)
CREAT SERPL-MCNC: 0.73 MG/DL (ref 0.57–1)
EGFRCR SERPLBLD CKD-EPI 2021: 98 ML/MIN/1.73
GLOBULIN SER CALC-MCNC: 2.3 G/DL (ref 1.5–4.5)
GLUCOSE SERPL-MCNC: 88 MG/DL (ref 70–99)
HDLC SERPL-MCNC: 54 MG/DL
LDLC SERPL CALC-MCNC: 118 MG/DL (ref 0–99)
POTASSIUM SERPL-SCNC: 4.1 MMOL/L (ref 3.5–5.2)
PROT SERPL-MCNC: 7 G/DL (ref 6–8.5)
SODIUM SERPL-SCNC: 137 MMOL/L (ref 134–144)
T4 FREE SERPL-MCNC: 1.88 NG/DL (ref 0.82–1.77)
TRIGL SERPL-MCNC: 205 MG/DL (ref 0–149)
TSH SERPL DL<=0.005 MIU/L-ACNC: 0.16 UIU/ML (ref 0.45–4.5)
VLDLC SERPL CALC-MCNC: 36 MG/DL (ref 5–40)

## 2023-10-07 LAB
ERYTHROCYTE [DISTWIDTH] IN BLOOD BY AUTOMATED COUNT: 12.3 % (ref 11.7–15.4)
HCT VFR BLD AUTO: 40.6 % (ref 34–46.6)
HGB BLD-MCNC: 13.9 G/DL (ref 11.1–15.9)
MCH RBC QN AUTO: 32.6 PG (ref 26.6–33)
MCHC RBC AUTO-ENTMCNC: 34.2 G/DL (ref 31.5–35.7)
MCV RBC AUTO: 95 FL (ref 79–97)
PLATELET # BLD AUTO: 276 X10E3/UL (ref 150–450)
RBC # BLD AUTO: 4.26 X10E6/UL (ref 3.77–5.28)
WBC # BLD AUTO: 6.1 X10E3/UL (ref 3.4–10.8)

## 2024-04-05 ENCOUNTER — OFFICE VISIT (OUTPATIENT)
Facility: CLINIC | Age: 54
End: 2024-04-05
Payer: COMMERCIAL

## 2024-04-05 VITALS
OXYGEN SATURATION: 99 % | HEART RATE: 72 BPM | HEIGHT: 70 IN | WEIGHT: 201.8 LBS | DIASTOLIC BLOOD PRESSURE: 70 MMHG | BODY MASS INDEX: 28.89 KG/M2 | RESPIRATION RATE: 16 BRPM | SYSTOLIC BLOOD PRESSURE: 126 MMHG | TEMPERATURE: 97.5 F

## 2024-04-05 DIAGNOSIS — Z12.11 SCREEN FOR COLON CANCER: ICD-10-CM

## 2024-04-05 DIAGNOSIS — K21.9 GASTRO-ESOPHAGEAL REFLUX DISEASE WITHOUT ESOPHAGITIS: ICD-10-CM

## 2024-04-05 DIAGNOSIS — I10 ESSENTIAL HYPERTENSION: ICD-10-CM

## 2024-04-05 DIAGNOSIS — Z00.00 ROUTINE PHYSICAL EXAMINATION: Primary | ICD-10-CM

## 2024-04-05 DIAGNOSIS — Z87.891 PERSONAL HISTORY OF TOBACCO USE: ICD-10-CM

## 2024-04-05 DIAGNOSIS — Z78.9 STATIN INTOLERANCE: ICD-10-CM

## 2024-04-05 DIAGNOSIS — E78.2 MIXED HYPERLIPIDEMIA: ICD-10-CM

## 2024-04-05 DIAGNOSIS — L30.9 ECZEMA OF LOWER LEG: ICD-10-CM

## 2024-04-05 DIAGNOSIS — E03.9 ACQUIRED HYPOTHYROIDISM: ICD-10-CM

## 2024-04-05 DIAGNOSIS — E53.8 B12 DEFICIENCY: ICD-10-CM

## 2024-04-05 PROCEDURE — 3074F SYST BP LT 130 MM HG: CPT | Performed by: NURSE PRACTITIONER

## 2024-04-05 PROCEDURE — 99396 PREV VISIT EST AGE 40-64: CPT | Performed by: NURSE PRACTITIONER

## 2024-04-05 PROCEDURE — G0296 VISIT TO DETERM LDCT ELIG: HCPCS | Performed by: NURSE PRACTITIONER

## 2024-04-05 PROCEDURE — 3078F DIAST BP <80 MM HG: CPT | Performed by: NURSE PRACTITIONER

## 2024-04-05 RX ORDER — EZETIMIBE 10 MG/1
10 TABLET ORAL DAILY
Qty: 90 TABLET | Refills: 1 | Status: SHIPPED | OUTPATIENT
Start: 2024-04-05

## 2024-04-05 RX ORDER — ATORVASTATIN CALCIUM 20 MG/1
20 TABLET, FILM COATED ORAL DAILY
Qty: 90 TABLET | Refills: 1 | Status: SHIPPED | OUTPATIENT
Start: 2024-04-05

## 2024-04-05 RX ORDER — LISINOPRIL AND HYDROCHLOROTHIAZIDE 20; 12.5 MG/1; MG/1
1 TABLET ORAL DAILY
Qty: 90 TABLET | Refills: 1 | Status: SHIPPED | OUTPATIENT
Start: 2024-04-05

## 2024-04-05 RX ORDER — PANTOPRAZOLE SODIUM 40 MG/1
TABLET, DELAYED RELEASE ORAL
Qty: 90 TABLET | Refills: 1 | Status: SHIPPED | OUTPATIENT
Start: 2024-04-05

## 2024-04-05 RX ORDER — CLOBETASOL PROPIONATE 0.5 MG/G
OINTMENT TOPICAL
Qty: 15 G | Refills: 2 | Status: SHIPPED | OUTPATIENT
Start: 2024-04-05

## 2024-04-05 RX ORDER — LEVOTHYROXINE SODIUM 175 UG/1
TABLET ORAL
Qty: 90 TABLET | Refills: 1 | Status: SHIPPED | OUTPATIENT
Start: 2024-04-05 | End: 2024-04-07 | Stop reason: DRUGHIGH

## 2024-04-05 ASSESSMENT — PATIENT HEALTH QUESTIONNAIRE - PHQ9
SUM OF ALL RESPONSES TO PHQ QUESTIONS 1-9: 0
1. LITTLE INTEREST OR PLEASURE IN DOING THINGS: NOT AT ALL
2. FEELING DOWN, DEPRESSED OR HOPELESS: NOT AT ALL
SUM OF ALL RESPONSES TO PHQ9 QUESTIONS 1 & 2: 0
SUM OF ALL RESPONSES TO PHQ QUESTIONS 1-9: 0

## 2024-04-05 NOTE — PROGRESS NOTES
Stephanie Maddox is a 53 y.o. female     Chief Complaint   Patient presents with    Annual Exam       /70 (Site: Left Upper Arm, Position: Sitting, Cuff Size: Medium Adult)   Pulse 72   Temp 97.5 °F (36.4 °C) (Oral)   Resp 16   Ht 1.778 m (5' 10\")   Wt 91.5 kg (201 lb 12.8 oz)   SpO2 99%   BMI 28.96 kg/m²     Health Maintenance Due   Topic Date Due    Hepatitis B vaccine (1 of 3 - 3-dose series) Never done    Pneumococcal 0-64 years Vaccine (1 of 2 - PCV) Never done    DTaP/Tdap/Td vaccine (1 - Tdap) Never done    Colorectal Cancer Screen  Never done    Shingles vaccine (1 of 2) Never done    Low dose CT lung screening &/or counseling  Never done    COVID-19 Vaccine (3 - 2023-24 season) 09/01/2023    Breast cancer screen  09/28/2023         \"Have you been to the ER, urgent care clinic since your last visit?  Hospitalized since your last visit?\"    NO    “Have you seen or consulted any other health care providers outside of [facility] since your last visit?”    NO    “Have you had a colorectal cancer screening such as a colonoscopy/FIT/Cologuard?    NO    No colonoscopy on file  No cologuard on file  No FIT/FOBT on file   No flexible sigmoidoscopy on file        Have you had a mammogram?”   YES - Where: [facility] Nurse/CMA to request most recent records if not in the chart    Date of last Mammogram: 9/28/2022

## 2024-04-05 NOTE — PROGRESS NOTES
Chief Complaint   Patient presents with    Annual Exam       SUBJECTIVE:    Stephanie Maddox is a 53 y.o. female who is here today for a routine physical exam as well as follow up appointment regarding current medical conditions including: Essential hypertension, mixed hyperlipidemia, acquired hypothyroidism, GERD, vitamin B-12 deficiency, eczema of lower extremity, and ongoing nicotine dependence.  She states she is feeling relatively well overall and denies any new or acute complaints at this time.    The patient currently remains on lisinopril/HCTZ daily for management of her hypertension.  Her blood pressure has remained stable and in good control overall.  She has been tolerating the medication well.  She remains on a combination of atorvastatin as well as ezetimibe due to her mixed hyperlipidemia and tolerates this well.  Previous attempts of using atorvastatin at higher doses caused myalgia-like side effects and therefore she was kept at a lower dose and with the addition of ezetimibe.  She continues to tolerate this well.  She denies any recent episodes of chest pain, chest pressure, headaches, dizziness, blurred vision, palpitations, or syncope episodes.    She continues to take levothyroxine daily for management of her hypothyroidism.  She has been tolerating this well.  She is currently on pantoprazole for due to her GERD.  Her symptoms are well-controlled and stable with use.    She has a history of pernicious anemia with vitamin B-12 deficiency and continues to self administer B12 injections at home.    The patient had previously been referred to gastroenterology for screening colonoscopy.  She states she had 2 separate appointments which were both canceled due to provider issues.  She would like referral to a new gastroenterologist or specialist if possible.    Current medications reviewed and updated in patient record.  Previous labs were reviewed prior to patient encounter.      Current

## 2024-04-06 LAB
ALBUMIN SERPL-MCNC: 4.4 G/DL (ref 3.8–4.9)
ALBUMIN/GLOB SERPL: 1.8 {RATIO} (ref 1.2–2.2)
ALP SERPL-CCNC: 116 IU/L (ref 44–121)
ALT SERPL-CCNC: 24 IU/L (ref 0–32)
APPEARANCE UR: CLEAR
AST SERPL-CCNC: 22 IU/L (ref 0–40)
BILIRUB SERPL-MCNC: 0.5 MG/DL (ref 0–1.2)
BILIRUB UR QL STRIP: NEGATIVE
BUN SERPL-MCNC: 7 MG/DL (ref 6–24)
BUN/CREAT SERPL: 9 (ref 9–23)
CALCIUM SERPL-MCNC: 9.6 MG/DL (ref 8.7–10.2)
CHLORIDE SERPL-SCNC: 98 MMOL/L (ref 96–106)
CHOLEST SERPL-MCNC: 198 MG/DL (ref 100–199)
CO2 SERPL-SCNC: 26 MMOL/L (ref 20–29)
COLOR UR: YELLOW
CREAT SERPL-MCNC: 0.77 MG/DL (ref 0.57–1)
EGFRCR SERPLBLD CKD-EPI 2021: 92 ML/MIN/1.73
ERYTHROCYTE [DISTWIDTH] IN BLOOD BY AUTOMATED COUNT: 12.4 % (ref 11.7–15.4)
GLOBULIN SER CALC-MCNC: 2.5 G/DL (ref 1.5–4.5)
GLUCOSE SERPL-MCNC: 94 MG/DL (ref 70–99)
GLUCOSE UR QL STRIP: NEGATIVE
HCT VFR BLD AUTO: 42.4 % (ref 34–46.6)
HDLC SERPL-MCNC: 49 MG/DL
HGB BLD-MCNC: 14 G/DL (ref 11.1–15.9)
HGB UR QL STRIP: NEGATIVE
KETONES UR QL STRIP: NEGATIVE
LDLC SERPL CALC-MCNC: 117 MG/DL (ref 0–99)
LEUKOCYTE ESTERASE UR QL STRIP: NEGATIVE
MCH RBC QN AUTO: 31.8 PG (ref 26.6–33)
MCHC RBC AUTO-ENTMCNC: 33 G/DL (ref 31.5–35.7)
MCV RBC AUTO: 96 FL (ref 79–97)
MICRO URNS: NORMAL
NITRITE UR QL STRIP: NEGATIVE
PH UR STRIP: 6 [PH] (ref 5–7.5)
PLATELET # BLD AUTO: 286 X10E3/UL (ref 150–450)
POTASSIUM SERPL-SCNC: 3.8 MMOL/L (ref 3.5–5.2)
PROT SERPL-MCNC: 6.9 G/DL (ref 6–8.5)
PROT UR QL STRIP: NEGATIVE
RBC # BLD AUTO: 4.4 X10E6/UL (ref 3.77–5.28)
SODIUM SERPL-SCNC: 142 MMOL/L (ref 134–144)
SP GR UR STRIP: 1.02 (ref 1–1.03)
T4 FREE SERPL-MCNC: 2.07 NG/DL (ref 0.82–1.77)
TRIGL SERPL-MCNC: 183 MG/DL (ref 0–149)
TSH SERPL DL<=0.005 MIU/L-ACNC: 0.1 UIU/ML (ref 0.45–4.5)
UROBILINOGEN UR STRIP-MCNC: 0.2 MG/DL (ref 0.2–1)
VLDLC SERPL CALC-MCNC: 32 MG/DL (ref 5–40)
WBC # BLD AUTO: 6.7 X10E3/UL (ref 3.4–10.8)

## 2024-04-07 LAB — VIT B12 SERPL-MCNC: 1493 PG/ML (ref 232–1245)

## 2024-04-07 RX ORDER — LEVOTHYROXINE SODIUM 0.15 MG/1
150 TABLET ORAL DAILY
Qty: 90 TABLET | Refills: 1 | Status: SHIPPED | OUTPATIENT
Start: 2024-04-07

## 2024-04-12 DIAGNOSIS — E03.9 ACQUIRED HYPOTHYROIDISM: Primary | ICD-10-CM

## 2024-07-12 ENCOUNTER — LAB (OUTPATIENT)
Facility: CLINIC | Age: 54
End: 2024-07-12

## 2024-07-12 DIAGNOSIS — E03.9 ACQUIRED HYPOTHYROIDISM: ICD-10-CM

## 2024-07-12 DIAGNOSIS — K21.9 GASTRO-ESOPHAGEAL REFLUX DISEASE WITHOUT ESOPHAGITIS: ICD-10-CM

## 2024-07-12 DIAGNOSIS — I10 ESSENTIAL HYPERTENSION: ICD-10-CM

## 2024-07-12 RX ORDER — PANTOPRAZOLE SODIUM 40 MG/1
TABLET, DELAYED RELEASE ORAL
Qty: 90 TABLET | Refills: 1 | Status: SHIPPED | OUTPATIENT
Start: 2024-07-12

## 2024-07-12 RX ORDER — LISINOPRIL AND HYDROCHLOROTHIAZIDE 20; 12.5 MG/1; MG/1
1 TABLET ORAL DAILY
Qty: 90 TABLET | Refills: 1 | Status: SHIPPED | OUTPATIENT
Start: 2024-07-12

## 2024-07-12 NOTE — TELEPHONE ENCOUNTER
Please refill lisinopril and pantoprazole. Please send to mail order pharmacy,[facility] mail.

## 2024-07-12 NOTE — TELEPHONE ENCOUNTER
PCP: Ankit Clark APRN - NP    Last appt: 4/5/2024    Future Appointments   Date Time Provider Department Center   10/10/2024  8:30 AM Ankit Clark APRN - NP [facility] AMB       Requested Prescriptions     Pending Prescriptions Disp Refills    lisinopril-hydroCHLOROthiazide (PRINZIDE;ZESTORETIC) 20-12.5 MG per tablet 90 tablet 1     Sig: Take 1 tablet by mouth daily    pantoprazole (PROTONIX) 40 MG tablet 90 tablet 1     Sig: TAKE 1 TABLET DAILY FOR    INDIGESTION

## 2024-07-13 LAB
T4 FREE SERPL-MCNC: 1.61 NG/DL (ref 0.82–1.77)
TSH SERPL DL<=0.005 MIU/L-ACNC: 1.49 UIU/ML (ref 0.45–4.5)

## 2024-10-08 DIAGNOSIS — E03.9 ACQUIRED HYPOTHYROIDISM: ICD-10-CM

## 2024-10-08 RX ORDER — LEVOTHYROXINE SODIUM 150 UG/1
150 TABLET ORAL DAILY
Qty: 90 TABLET | Refills: 1 | Status: SHIPPED | OUTPATIENT
Start: 2024-10-08 | End: 2024-10-10 | Stop reason: SDUPTHER

## 2024-10-08 NOTE — TELEPHONE ENCOUNTER
levothyroxine (EUTHYROX) 150 MCG tablet 90 tablet 1 4/7/2024 --    Sig - Route: Take 1 tablet by mouth Daily - Oral      Last visit 4/5/24  Future appt 10/10/24    Pharmacy [facility]

## 2024-10-08 NOTE — TELEPHONE ENCOUNTER
PCP: Ankit Clark APRN - NP    Last appt: 4/5/2024    Future Appointments   Date Time Provider Department Center   10/10/2024  8:30 AM Ankit Clark APRN - NP [facility]       Requested Prescriptions     Pending Prescriptions Disp Refills    levothyroxine (SYNTHROID) 150 MCG tablet 90 tablet 1     Sig: Take 1 tablet by mouth Daily

## 2024-10-10 ENCOUNTER — OFFICE VISIT (OUTPATIENT)
Facility: CLINIC | Age: 54
End: 2024-10-10
Payer: COMMERCIAL

## 2024-10-10 VITALS
WEIGHT: 205.8 LBS | SYSTOLIC BLOOD PRESSURE: 116 MMHG | RESPIRATION RATE: 16 BRPM | HEIGHT: 70 IN | BODY MASS INDEX: 29.46 KG/M2 | DIASTOLIC BLOOD PRESSURE: 74 MMHG | OXYGEN SATURATION: 96 % | TEMPERATURE: 98.4 F | HEART RATE: 76 BPM

## 2024-10-10 DIAGNOSIS — E03.9 ACQUIRED HYPOTHYROIDISM: ICD-10-CM

## 2024-10-10 DIAGNOSIS — I10 ESSENTIAL HYPERTENSION: Primary | ICD-10-CM

## 2024-10-10 DIAGNOSIS — K21.9 GASTRO-ESOPHAGEAL REFLUX DISEASE WITHOUT ESOPHAGITIS: ICD-10-CM

## 2024-10-10 DIAGNOSIS — D51.0 VITAMIN B12 DEFICIENCY ANEMIA DUE TO INTRINSIC FACTOR DEFICIENCY: ICD-10-CM

## 2024-10-10 DIAGNOSIS — E78.2 MIXED HYPERLIPIDEMIA: ICD-10-CM

## 2024-10-10 PROCEDURE — 3078F DIAST BP <80 MM HG: CPT | Performed by: NURSE PRACTITIONER

## 2024-10-10 PROCEDURE — 99214 OFFICE O/P EST MOD 30 MIN: CPT | Performed by: NURSE PRACTITIONER

## 2024-10-10 PROCEDURE — 3074F SYST BP LT 130 MM HG: CPT | Performed by: NURSE PRACTITIONER

## 2024-10-10 RX ORDER — ATORVASTATIN CALCIUM 20 MG/1
20 TABLET, FILM COATED ORAL DAILY
Qty: 90 TABLET | Refills: 1 | Status: SHIPPED | OUTPATIENT
Start: 2024-10-10

## 2024-10-10 RX ORDER — LEVOTHYROXINE SODIUM 150 UG/1
150 TABLET ORAL DAILY
Qty: 90 TABLET | Refills: 1 | Status: SHIPPED | OUTPATIENT
Start: 2024-10-10

## 2024-10-10 RX ORDER — PANTOPRAZOLE SODIUM 40 MG/1
TABLET, DELAYED RELEASE ORAL
Qty: 90 TABLET | Refills: 1 | Status: SHIPPED | OUTPATIENT
Start: 2024-10-10

## 2024-10-10 RX ORDER — SYRINGE W-NEEDLE,DISPOSAB,3 ML 25GX5/8"
1 SYRINGE, EMPTY DISPOSABLE MISCELLANEOUS
Qty: 30 EACH | Refills: 1 | Status: SHIPPED | OUTPATIENT
Start: 2024-10-10

## 2024-10-10 RX ORDER — EZETIMIBE 10 MG/1
10 TABLET ORAL DAILY
Qty: 90 TABLET | Refills: 1 | Status: SHIPPED | OUTPATIENT
Start: 2024-10-10

## 2024-10-10 RX ORDER — LISINOPRIL AND HYDROCHLOROTHIAZIDE 12.5; 2 MG/1; MG/1
1 TABLET ORAL DAILY
Qty: 90 TABLET | Refills: 1 | Status: SHIPPED | OUTPATIENT
Start: 2024-10-10

## 2024-10-10 RX ORDER — CYANOCOBALAMIN 1000 UG/ML
INJECTION, SOLUTION INTRAMUSCULAR; SUBCUTANEOUS
Qty: 3 EACH | Refills: 1 | Status: SHIPPED | OUTPATIENT
Start: 2024-10-10

## 2024-10-10 SDOH — ECONOMIC STABILITY: FOOD INSECURITY: WITHIN THE PAST 12 MONTHS, THE FOOD YOU BOUGHT JUST DIDN'T LAST AND YOU DIDN'T HAVE MONEY TO GET MORE.: NEVER TRUE

## 2024-10-10 SDOH — ECONOMIC STABILITY: INCOME INSECURITY: HOW HARD IS IT FOR YOU TO PAY FOR THE VERY BASICS LIKE FOOD, HOUSING, MEDICAL CARE, AND HEATING?: NOT VERY HARD

## 2024-10-10 SDOH — ECONOMIC STABILITY: FOOD INSECURITY: WITHIN THE PAST 12 MONTHS, YOU WORRIED THAT YOUR FOOD WOULD RUN OUT BEFORE YOU GOT MONEY TO BUY MORE.: NEVER TRUE

## 2024-10-10 SDOH — ECONOMIC STABILITY: TRANSPORTATION INSECURITY
IN THE PAST 12 MONTHS, HAS LACK OF TRANSPORTATION KEPT YOU FROM MEETINGS, WORK, OR FROM GETTING THINGS NEEDED FOR DAILY LIVING?: NO

## 2024-10-10 NOTE — PROGRESS NOTES
Stephanie Maddox is a 54 y.o. female     Chief Complaint   Patient presents with    Thyroid Problem     5M       /74 (Site: Left Upper Arm, Position: Sitting, Cuff Size: Medium Adult)   Pulse 76   Temp 98.4 °F (36.9 °C) (Oral)   Resp 16   Ht 1.778 m (5' 10\")   Wt 93.4 kg (205 lb 12.8 oz)   SpO2 96%   BMI 29.53 kg/m²     Health Maintenance Due   Topic Date Due    Pneumococcal 0-64 years Vaccine (1 of 2 - PCV) Never done    Hepatitis B vaccine (1 of 3 - 19+ 3-dose series) Never done    DTaP/Tdap/Td vaccine (1 - Tdap) Never done    Shingles vaccine (1 of 2) Never done    Lung Cancer Screening &/or Counseling  Never done    Breast cancer screen  09/28/2023    Flu vaccine (1) Never done    COVID-19 Vaccine (3 - 2023-24 season) 09/01/2024         \"Have you been to the ER, urgent care clinic since your last visit?  Hospitalized since your last visit?\"    NO    “Have you seen or consulted any other health care providers outside of [facility] since your last visit?”    NO       Have you had a mammogram?”   NO    Date of last Mammogram: 9/28/2022                   
ascultation X5, normal inspiratory effort, no adventitious breath sounds.  Cardiovascular: Regular rate and rhythm, no rubs or gallops, PMI not displaced, No thrills  Neuro: Non-focal exam, A & O X 3.   Psychiatric: Appropriate affect and demeanor, pleasant and cooperative. Patient's thought content and thought processing appear to be within normal limits.     ASSESSMENT/PLAN:      Diagnosis Orders   1. Essential hypertension  Comprehensive Metabolic Panel    lisinopril-hydroCHLOROthiazide (PRINZIDE;ZESTORETIC) 20-12.5 MG per tablet      2. Mixed hyperlipidemia  Comprehensive Metabolic Panel    Lipid Panel    ezetimibe (ZETIA) 10 MG tablet    atorvastatin (LIPITOR) 20 MG tablet      3. Acquired hypothyroidism  TSH + Free T4 Panel    levothyroxine (SYNTHROID) 150 MCG tablet      4. Gastro-esophageal reflux disease without esophagitis  pantoprazole (PROTONIX) 40 MG tablet      5. Vitamin B12 deficiency anemia due to intrinsic factor deficiency  Syringe/Needle, Disp, (SYRINGE 3CC/61CG4-6/2\") 22G X 1-1/2\" 3 ML MISC    cyanocobalamin 1000 MCG/ML injection    Vitamin B12        1: Labs ordered today include: CMP, lipid panel, TSH, free T4, and B12 level.  2: Patient to continue lisinopril/HCTZ daily for management of hypertension.  Blood pressure appears stable and well-controlled.  3: Continue ezetimibe and atorvastatin daily for management of mixed hyperlipidemia.  Patient tolerating well.  4: Continue levothyroxine daily for management of acquired hypothyroidism.  TSH stable.  5: Continue pantoprazole daily for management of GERD.  Symptoms stable.  6: Continue B12 injections for management of deficiency.  Patient tolerating well.  7: Patient encouraged to focus on healthy lifestyle management with appropriate dietary intake.  Low-fat/low-cholesterol diet recommended with no added salt.  Maintain adequate amounts of fiber and water.  Continue regular patterns of exercise.  Stop smoking.  8: Continue all other

## 2024-10-11 LAB
ALBUMIN SERPL-MCNC: 4.3 G/DL (ref 3.8–4.9)
ALP SERPL-CCNC: 112 IU/L (ref 44–121)
ALT SERPL-CCNC: 27 IU/L (ref 0–32)
AST SERPL-CCNC: 31 IU/L (ref 0–40)
BILIRUB SERPL-MCNC: 0.4 MG/DL (ref 0–1.2)
BUN SERPL-MCNC: 3 MG/DL (ref 6–24)
BUN/CREAT SERPL: 4 (ref 9–23)
CALCIUM SERPL-MCNC: 9 MG/DL (ref 8.7–10.2)
CHLORIDE SERPL-SCNC: 95 MMOL/L (ref 96–106)
CHOLEST SERPL-MCNC: 194 MG/DL (ref 100–199)
CO2 SERPL-SCNC: 22 MMOL/L (ref 20–29)
CREAT SERPL-MCNC: 0.72 MG/DL (ref 0.57–1)
EGFRCR SERPLBLD CKD-EPI 2021: 99 ML/MIN/1.73
GLOBULIN SER CALC-MCNC: 2.1 G/DL (ref 1.5–4.5)
GLUCOSE SERPL-MCNC: 83 MG/DL (ref 70–99)
HDLC SERPL-MCNC: 56 MG/DL
LDLC SERPL CALC-MCNC: 102 MG/DL (ref 0–99)
POTASSIUM SERPL-SCNC: 4.1 MMOL/L (ref 3.5–5.2)
PROT SERPL-MCNC: 6.4 G/DL (ref 6–8.5)
SODIUM SERPL-SCNC: 136 MMOL/L (ref 134–144)
T4 FREE SERPL-MCNC: 1.82 NG/DL (ref 0.82–1.77)
TRIGL SERPL-MCNC: 210 MG/DL (ref 0–149)
TSH SERPL DL<=0.005 MIU/L-ACNC: 1.53 UIU/ML (ref 0.45–4.5)
VIT B12 SERPL-MCNC: 685 PG/ML (ref 232–1245)
VLDLC SERPL CALC-MCNC: 36 MG/DL (ref 5–40)

## 2025-01-03 DIAGNOSIS — E78.2 MIXED HYPERLIPIDEMIA: ICD-10-CM

## 2025-01-03 RX ORDER — EZETIMIBE 10 MG/1
10 TABLET ORAL DAILY
Qty: 90 TABLET | Refills: 1 | Status: SHIPPED | OUTPATIENT
Start: 2025-01-03

## 2025-01-03 NOTE — TELEPHONE ENCOUNTER
PCP: Ankit Clark APRN - NP    Last appt: 10/10/2024    Future Appointments   Date Time Provider Department Center   4/15/2025  8:00 AM Ankit Clark APRN - NP [facility] DEP       Requested Prescriptions     Pending Prescriptions Disp Refills    ezetimibe (ZETIA) 10 MG tablet [Pharmacy Med Name: EZETIMIBE 10MG TABS] 90 tablet 1     Sig: TAKE ONE TABLET BY MOUTH EVERY DAY

## 2025-02-13 DIAGNOSIS — K21.9 GASTRO-ESOPHAGEAL REFLUX DISEASE WITHOUT ESOPHAGITIS: ICD-10-CM

## 2025-02-13 RX ORDER — PANTOPRAZOLE SODIUM 40 MG/1
TABLET, DELAYED RELEASE ORAL
Qty: 90 TABLET | Refills: 1 | Status: SHIPPED | OUTPATIENT
Start: 2025-02-13

## 2025-04-07 DIAGNOSIS — E78.2 MIXED HYPERLIPIDEMIA: ICD-10-CM

## 2025-04-07 RX ORDER — ATORVASTATIN CALCIUM 20 MG/1
20 TABLET, FILM COATED ORAL DAILY
Qty: 90 TABLET | Refills: 1 | Status: SHIPPED | OUTPATIENT
Start: 2025-04-07

## 2025-04-15 ENCOUNTER — OFFICE VISIT (OUTPATIENT)
Facility: CLINIC | Age: 55
End: 2025-04-15
Payer: COMMERCIAL

## 2025-04-15 VITALS
HEART RATE: 80 BPM | HEIGHT: 70 IN | BODY MASS INDEX: 29.69 KG/M2 | DIASTOLIC BLOOD PRESSURE: 84 MMHG | RESPIRATION RATE: 16 BRPM | TEMPERATURE: 97.3 F | OXYGEN SATURATION: 97 % | WEIGHT: 207.4 LBS | SYSTOLIC BLOOD PRESSURE: 128 MMHG

## 2025-04-15 DIAGNOSIS — Z71.6 ENCOUNTER FOR TOBACCO USE CESSATION COUNSELING: ICD-10-CM

## 2025-04-15 DIAGNOSIS — Z92.241 PERSONAL HISTORY OF STEROID THERAPY: ICD-10-CM

## 2025-04-15 DIAGNOSIS — Z00.00 ROUTINE PHYSICAL EXAMINATION: Primary | ICD-10-CM

## 2025-04-15 DIAGNOSIS — E78.2 MIXED HYPERLIPIDEMIA: ICD-10-CM

## 2025-04-15 DIAGNOSIS — Z87.81 HISTORY OF WRIST FRACTURE: ICD-10-CM

## 2025-04-15 DIAGNOSIS — Z78.0 POST-MENOPAUSAL: ICD-10-CM

## 2025-04-15 DIAGNOSIS — I10 ESSENTIAL HYPERTENSION: ICD-10-CM

## 2025-04-15 DIAGNOSIS — K21.9 GASTRO-ESOPHAGEAL REFLUX DISEASE WITHOUT ESOPHAGITIS: ICD-10-CM

## 2025-04-15 DIAGNOSIS — F17.218 CIGARETTE NICOTINE DEPENDENCE WITH OTHER NICOTINE-INDUCED DISORDER: ICD-10-CM

## 2025-04-15 DIAGNOSIS — E03.9 ACQUIRED HYPOTHYROIDISM: ICD-10-CM

## 2025-04-15 DIAGNOSIS — D51.0 VITAMIN B12 DEFICIENCY ANEMIA DUE TO INTRINSIC FACTOR DEFICIENCY: ICD-10-CM

## 2025-04-15 PROBLEM — F17.200 NICOTINE DEPENDENCE: Status: ACTIVE | Noted: 2025-04-15

## 2025-04-15 PROCEDURE — 3074F SYST BP LT 130 MM HG: CPT | Performed by: NURSE PRACTITIONER

## 2025-04-15 PROCEDURE — 3079F DIAST BP 80-89 MM HG: CPT | Performed by: NURSE PRACTITIONER

## 2025-04-15 PROCEDURE — 99396 PREV VISIT EST AGE 40-64: CPT | Performed by: NURSE PRACTITIONER

## 2025-04-15 RX ORDER — CYANOCOBALAMIN 1000 UG/ML
INJECTION, SOLUTION INTRAMUSCULAR; SUBCUTANEOUS
Qty: 3 EACH | Refills: 1 | Status: SHIPPED | OUTPATIENT
Start: 2025-04-15

## 2025-04-15 RX ORDER — PANTOPRAZOLE SODIUM 40 MG/1
TABLET, DELAYED RELEASE ORAL
Qty: 90 TABLET | Refills: 1 | Status: SHIPPED | OUTPATIENT
Start: 2025-04-15

## 2025-04-15 RX ORDER — LISINOPRIL AND HYDROCHLOROTHIAZIDE 12.5; 2 MG/1; MG/1
1 TABLET ORAL DAILY
Qty: 90 TABLET | Refills: 1 | Status: SHIPPED | OUTPATIENT
Start: 2025-04-15

## 2025-04-15 RX ORDER — SYRINGE W-NEEDLE,DISPOSAB,3 ML 25GX5/8"
1 SYRINGE, EMPTY DISPOSABLE MISCELLANEOUS
Qty: 30 EACH | Refills: 1 | Status: SHIPPED | OUTPATIENT
Start: 2025-04-15

## 2025-04-15 RX ORDER — ATORVASTATIN CALCIUM 20 MG/1
20 TABLET, FILM COATED ORAL DAILY
Qty: 90 TABLET | Refills: 1 | Status: SHIPPED | OUTPATIENT
Start: 2025-04-15

## 2025-04-15 RX ORDER — EZETIMIBE 10 MG/1
10 TABLET ORAL DAILY
Qty: 90 TABLET | Refills: 1 | Status: SHIPPED | OUTPATIENT
Start: 2025-04-15

## 2025-04-15 RX ORDER — LEVOTHYROXINE SODIUM 150 UG/1
150 TABLET ORAL DAILY
Qty: 90 TABLET | Refills: 1 | Status: SHIPPED | OUTPATIENT
Start: 2025-04-15 | End: 2025-04-18 | Stop reason: DRUGHIGH

## 2025-04-15 SDOH — ECONOMIC STABILITY: FOOD INSECURITY: WITHIN THE PAST 12 MONTHS, THE FOOD YOU BOUGHT JUST DIDN'T LAST AND YOU DIDN'T HAVE MONEY TO GET MORE.: NEVER TRUE

## 2025-04-15 SDOH — ECONOMIC STABILITY: FOOD INSECURITY: WITHIN THE PAST 12 MONTHS, YOU WORRIED THAT YOUR FOOD WOULD RUN OUT BEFORE YOU GOT MONEY TO BUY MORE.: NEVER TRUE

## 2025-04-15 ASSESSMENT — PATIENT HEALTH QUESTIONNAIRE - PHQ9
2. FEELING DOWN, DEPRESSED OR HOPELESS: NOT AT ALL
SUM OF ALL RESPONSES TO PHQ QUESTIONS 1-9: 0
SUM OF ALL RESPONSES TO PHQ QUESTIONS 1-9: 0
1. LITTLE INTEREST OR PLEASURE IN DOING THINGS: NOT AT ALL
SUM OF ALL RESPONSES TO PHQ QUESTIONS 1-9: 0
SUM OF ALL RESPONSES TO PHQ QUESTIONS 1-9: 0

## 2025-04-15 NOTE — PROGRESS NOTES
Chief Complaint   Patient presents with    Annual Exam       SUBJECTIVE:    Stephanie Maddox is a 54 y.o. female who is here today for a routine physical exam as well as follow up appointment regarding current medical conditions including: Essential hypertension, mixed hyperlipidemia, acquired hypothyroidism, GERD, and B12 deficiency.  She is also requesting screening for osteoporosis due to history of long-term steroid use, postmenopausal status, and recent left wrist fracture on 03/07/2025.    Patient has a longstanding history of hypertension and is currently on a combination of lisinopril/HCTZ daily to manage this.  She has been tolerating the medication well without adverse side effects.  Her blood pressures have remained stable.  She is currently taking a combination of atorvastatin and ezetimibe for management of her mixed hyperlipidemia and tolerates this well.  She has had no recent episodes of chest pain, chest pressure, headaches, dizziness, blurred vision, palpitations, or syncope episodes.    She remains on levothyroxine daily for management of her hypothyroidism.  She has been tolerating this well and denies any adverse side effects of the medication.  She denies any hot/cold intolerance.  She continues to take pantoprazole daily for management of GERD.  Her symptoms have been stable with use.    She has a longstanding history of B12 deficiency.  She is currently on B12 injections at home for this.  Her B12 levels have remained stable.    Additionally, the patient had been sleepwalking and fell, causing a fracture to her left wrist on 03/07/2025.  She had an open reduction internal fixation of this by Ortho.  At that time, there was concern for possible signs of osteoporosis.  She would like screening to be done.      Current Outpatient Medications   Medication Sig Dispense Refill    pantoprazole (PROTONIX) 40 MG tablet TAKE ONE TABLET BY MOUTH EVERY DAY FOR INDIGESTION 90 tablet 1

## 2025-04-15 NOTE — PROGRESS NOTES
Stephanie Maddox is a 54 y.o. female     Chief Complaint   Patient presents with    Annual Exam       /84   Pulse 80   Temp 97.3 °F (36.3 °C) (Oral)   Resp 16   Ht 1.778 m (5' 10\")   Wt 94.1 kg (207 lb 6.4 oz)   SpO2 97%   BMI 29.76 kg/m²     Health Maintenance Due   Topic Date Due    Hepatitis B vaccine (1 of 3 - 19+ 3-dose series) Never done    DTaP/Tdap/Td vaccine (1 - Tdap) Never done    Pneumococcal 50+ years Vaccine (1 of 2 - PCV) Never done    Shingles vaccine (1 of 2) Never done    Lung Cancer Screening &/or Counseling  Never done    Breast cancer screen  09/28/2023    COVID-19 Vaccine (3 - 2024-25 season) 09/01/2024    Depression Screen  04/05/2025         \"Have you been to the ER, urgent care clinic since your last visit?  Hospitalized since your last visit?\"    YES - When: approximately 1 months ago.  Where and Why: [facility] for wrist injury.    “Have you seen or consulted any other [facility] care providers [facility]?”    NO       Have you had a mammogram?”   NO    Date of last Mammogram: 9/28/2022

## 2025-04-16 LAB
BASOPHILS # BLD AUTO: 0.1 X10E3/UL (ref 0–0.2)
BASOPHILS NFR BLD AUTO: 1 %
EOSINOPHIL # BLD AUTO: 0.3 X10E3/UL (ref 0–0.4)
EOSINOPHIL NFR BLD AUTO: 5 %
ERYTHROCYTE [DISTWIDTH] IN BLOOD BY AUTOMATED COUNT: 12.8 % (ref 11.7–15.4)
HCT VFR BLD AUTO: 40.9 % (ref 34–46.6)
HGB BLD-MCNC: 14 G/DL (ref 11.1–15.9)
IMM GRANULOCYTES # BLD AUTO: 0 X10E3/UL (ref 0–0.1)
IMM GRANULOCYTES NFR BLD AUTO: 0 %
LYMPHOCYTES # BLD AUTO: 1.7 X10E3/UL (ref 0.7–3.1)
LYMPHOCYTES NFR BLD AUTO: 31 %
MCH RBC QN AUTO: 33.2 PG (ref 26.6–33)
MCHC RBC AUTO-ENTMCNC: 34.2 G/DL (ref 31.5–35.7)
MCV RBC AUTO: 97 FL (ref 79–97)
MONOCYTES # BLD AUTO: 0.4 X10E3/UL (ref 0.1–0.9)
MONOCYTES NFR BLD AUTO: 7 %
NEUTROPHILS # BLD AUTO: 3 X10E3/UL (ref 1.4–7)
NEUTROPHILS NFR BLD AUTO: 56 %
PLATELET # BLD AUTO: 263 X10E3/UL (ref 150–450)
RBC # BLD AUTO: 4.22 X10E6/UL (ref 3.77–5.28)
VIT B12 SERPL-MCNC: 478 PG/ML (ref 232–1245)
WBC # BLD AUTO: 5.4 X10E3/UL (ref 3.4–10.8)

## 2025-04-18 ENCOUNTER — RESULTS FOLLOW-UP (OUTPATIENT)
Facility: CLINIC | Age: 55
End: 2025-04-18

## 2025-04-18 DIAGNOSIS — E03.9 ACQUIRED HYPOTHYROIDISM: Primary | ICD-10-CM

## 2025-04-18 LAB
CHOLEST SERPL-MCNC: 205 MG/DL (ref 100–199)
HDLC SERPL-MCNC: 55 MG/DL
LDLC SERPL CALC-MCNC: 112 MG/DL (ref 0–99)
T4 FREE SERPL-MCNC: 1.35 NG/DL (ref 0.82–1.77)
TRIGL SERPL-MCNC: 223 MG/DL (ref 0–149)
TSH SERPL DL<=0.005 MIU/L-ACNC: 5.4 UIU/ML (ref 0.45–4.5)
VLDLC SERPL CALC-MCNC: 38 MG/DL (ref 5–40)

## 2025-04-18 RX ORDER — LEVOTHYROXINE SODIUM 175 UG/1
175 TABLET ORAL DAILY
Qty: 90 TABLET | Refills: 0 | Status: SHIPPED | OUTPATIENT
Start: 2025-04-18

## 2025-04-19 LAB
ALBUMIN SERPL-MCNC: 4.5 G/DL (ref 3.8–4.9)
ALP SERPL-CCNC: 124 IU/L (ref 44–121)
ALT SERPL-CCNC: 21 IU/L (ref 0–32)
AST SERPL-CCNC: 34 IU/L (ref 0–40)
BILIRUB SERPL-MCNC: <0.2 MG/DL (ref 0–1.2)
BUN SERPL-MCNC: 5 MG/DL (ref 6–24)
BUN/CREAT SERPL: 6 (ref 9–23)
CALCIUM SERPL-MCNC: 8.8 MG/DL (ref 8.7–10.2)
CHLORIDE SERPL-SCNC: 101 MMOL/L (ref 96–106)
CO2 SERPL-SCNC: 16 MMOL/L (ref 20–29)
CREAT SERPL-MCNC: 0.77 MG/DL (ref 0.57–1)
EGFRCR SERPLBLD CKD-EPI 2021: 91 ML/MIN/1.73
GLOBULIN SER CALC-MCNC: 2.5 G/DL (ref 1.5–4.5)
GLUCOSE SERPL-MCNC: 89 MG/DL (ref 70–99)
POTASSIUM SERPL-SCNC: 4.4 MMOL/L (ref 3.5–5.2)
PROT SERPL-MCNC: 7 G/DL (ref 6–8.5)
SODIUM SERPL-SCNC: 143 MMOL/L (ref 134–144)

## 2025-04-21 DIAGNOSIS — E03.9 ACQUIRED HYPOTHYROIDISM: Primary | ICD-10-CM

## 2025-06-02 ENCOUNTER — LAB (OUTPATIENT)
Facility: CLINIC | Age: 55
End: 2025-06-02

## 2025-06-02 DIAGNOSIS — E03.9 ACQUIRED HYPOTHYROIDISM: ICD-10-CM

## 2025-06-03 ENCOUNTER — RESULTS FOLLOW-UP (OUTPATIENT)
Facility: CLINIC | Age: 55
End: 2025-06-03

## 2025-06-03 LAB
T4 FREE SERPL-MCNC: 2.13 NG/DL (ref 0.82–1.77)
TSH SERPL DL<=0.005 MIU/L-ACNC: 0.13 UIU/ML (ref 0.45–4.5)

## 2025-06-04 DIAGNOSIS — E03.9 ACQUIRED HYPOTHYROIDISM: Primary | ICD-10-CM

## 2025-07-17 DIAGNOSIS — E03.9 ACQUIRED HYPOTHYROIDISM: ICD-10-CM

## 2025-07-17 RX ORDER — LEVOTHYROXINE SODIUM 175 UG/1
175 TABLET ORAL DAILY
Qty: 90 TABLET | Refills: 1 | Status: SHIPPED | OUTPATIENT
Start: 2025-07-17

## 2025-07-17 NOTE — TELEPHONE ENCOUNTER
levothyroxine (SYNTHROID) 175 MCG tablet 30 tablet 0 4/18/2025 --    Sig - Route: Take 1 tablet by mouth daily - Oral      Pharmacy [facility] [address]    Last visit 4/15/25  Future appt 7/23/25 labs

## 2025-07-23 ENCOUNTER — LAB (OUTPATIENT)
Facility: CLINIC | Age: 55
End: 2025-07-23

## 2025-07-23 DIAGNOSIS — E03.9 ACQUIRED HYPOTHYROIDISM: ICD-10-CM

## 2025-07-25 LAB
T4 FREE SERPL-MCNC: 2.72 NG/DL (ref 0.82–1.77)
TSH SERPL DL<=0.005 MIU/L-ACNC: 0.04 UIU/ML (ref 0.45–4.5)

## 2025-07-28 ENCOUNTER — TELEPHONE (OUTPATIENT)
Facility: CLINIC | Age: 55
End: 2025-07-28

## 2025-07-28 DIAGNOSIS — E03.9 ACQUIRED HYPOTHYROIDISM: ICD-10-CM

## 2025-07-28 DIAGNOSIS — E03.9 ACQUIRED HYPOTHYROIDISM: Primary | ICD-10-CM

## 2025-07-28 RX ORDER — LEVOTHYROXINE SODIUM 175 UG/1
TABLET ORAL
Qty: 90 TABLET | Refills: 1 | Status: SHIPPED | OUTPATIENT
Start: 2025-07-28

## 2025-07-28 NOTE — TELEPHONE ENCOUNTER
PCP: Ankit Clark APRN - NP    Last appt: 4/15/2025    Future Appointments   Date Time Provider Department Center   9/8/2025  8:00 AM LAB ONLY [facility] DEP   11/6/2025 10:00 AM Ankit Clark APRN - NP [facility] DEP       Requested Prescriptions     Pending Prescriptions Disp Refills    levothyroxine (SYNTHROID) 175 MCG tablet 90 tablet 1     Sig: Take 1 tablet by mouth daily